# Patient Record
Sex: FEMALE | Race: WHITE | HISPANIC OR LATINO | Employment: STUDENT | URBAN - METROPOLITAN AREA
[De-identification: names, ages, dates, MRNs, and addresses within clinical notes are randomized per-mention and may not be internally consistent; named-entity substitution may affect disease eponyms.]

---

## 2019-10-14 ENCOUNTER — OFFICE VISIT (OUTPATIENT)
Dept: OBSTETRICS AND GYNECOLOGY | Facility: CLINIC | Age: 29
End: 2019-10-14

## 2019-10-14 VITALS
WEIGHT: 115 LBS | BODY MASS INDEX: 21.16 KG/M2 | DIASTOLIC BLOOD PRESSURE: 60 MMHG | HEIGHT: 62 IN | SYSTOLIC BLOOD PRESSURE: 122 MMHG

## 2019-10-14 DIAGNOSIS — Z01.419 ENCNTR FOR GYN EXAM (GENERAL) (ROUTINE) W/O ABN FINDINGS: Primary | ICD-10-CM

## 2019-10-14 DIAGNOSIS — Z97.5 INTRAUTERINE DEVICE: ICD-10-CM

## 2019-10-14 PROCEDURE — 99385 PREV VISIT NEW AGE 18-39: CPT | Performed by: OBSTETRICS & GYNECOLOGY

## 2019-10-14 NOTE — PROGRESS NOTES
Subjective   Chief Complaint   Patient presents with   • Gynecologic Exam     annual; no c/o; wants Jen removed/replaced (advised we could not do this today)     Kelli Giles is a 28 y.o. year old  presenting to be seen for her annual exam.     SEXUAL Hx:  She is currently sexually active.  In the past year there there has been NO new sexual partners.    Condoms are never used.  She would not like to be screened for STD's at today's exam.  Current birth control method: IUD -- Jen; due for replacement.  She is happy with her current method of contraception and does not want to discuss alternative methods of contraception.  MENSTRUAL Hx:  Patient's last menstrual period was 10/10/2019 (exact date).  In the past 6 months her cycles have been unpredictable infrequent.  Her menstrual flow is typically light.   Each month on average there are roughly 0 day(s) of very heavy flow.    Intermenstrual bleeding is absent.    Post-coital bleeding is absent.  Dysmenorrhea: none  PMS: none  Her cycles are not a source of concern for her that she wishes to discuss today.  HEALTH Hx:  She exercises regularly: yes.  She wears her seat belt: yes.  She has concerns about domestic violence: no.  OTHER THINGS SHE WANTS TO DISCUSS TODAY:  Nothing else    The following portions of the patient's history were reviewed and updated as appropriate:problem list, current medications, allergies, past family history, past medical history, past social history and past surgical history.    Social History    Tobacco Use      Smoking status: Never Smoker      Smokeless tobacco: Never Used    Review of Systems  Constitutional POS: nothing reported    NEG: anorexia or night sweats   Genitourinary POS: nothing reported    NEG: dysuria or hematuria      Gastointestinal POS: nothing reported    NEG: bloating, change in bowel habits, melena or reflux symptoms   Integument POS: nothing reported    NEG: moles that are changing in size, shape, color  "or rashes   Breast POS: nothing reported    NEG: persistent breast lump, skin dimpling or nipple discharge        Objective   /60   Ht 157.5 cm (62\")   Wt 52.2 kg (115 lb)   LMP 10/10/2019 (Exact Date)   Breastfeeding? No   BMI 21.03 kg/m²     General:  well developed; well nourished  no acute distress   Skin:  No suspicious lesions seen   Thyroid: normal to inspection and palpation   Breasts:  Examined in supine position  Symmetric without masses or skin dimpling  Nipples normal without inversion, lesions or discharge  There are no palpable axillary nodes   Abdomen: soft, non-tender; no masses  no umbilical or inguinal hernias are present  no hepato-splenomegaly   Pelvis: Clinical staff was present for exam  External genitalia:  normal appearance of the external genitalia including Bartholin's and [place]'s glands.  :  urethral meatus normal;  Vaginal:  normal pink mucosa without prolapse or lesions.  Cervix:  normal appearance. IUD string not seen;  Uterus:  normal size, shape and consistency.  Adnexa:  normal bimanual exam of the adnexa.        Assessment   1. Normal GYN exam  2. Jen IUD     Plan   1. Pap was done today.  If she does not receive the results of the Pap within 2 weeks  time, she was instructed to call to find out the results.  I explained to Kelli that the recommendations for Pap smear interval in a low risk patient's has lengthened to 3 years time.  I encouraged her to be seen yearly for a full physical exam including breast and pelvic exam even during the off years when PAP's will not be performed.  2. IUD string not visualized. IUD due for removal. Plan to obtain ultrasound to confirm placement and then attempt blind removal. If unsuccessful explained to patient we will need to do a hysteroscopic removal. Patient would like IUD replaced once removed with Kyleena.  3. No prescription was given or electronically sent at today's visit  4. The importance of keeping all planned follow-up " and taking all medications as prescribed was emphasized.  5. Follow up after ultrasound     No orders of the defined types were placed in this encounter.         This note was electronically signed.    Sarah Castillo, DO  October 14, 2019    Note: Speech recognition transcription software may have been used to create portions of this document.  An attempt at proofreading has been made but errors in transcription could still be present.

## 2019-10-28 ENCOUNTER — OFFICE VISIT (OUTPATIENT)
Dept: OBSTETRICS AND GYNECOLOGY | Facility: CLINIC | Age: 29
End: 2019-10-28

## 2019-10-28 VITALS — BODY MASS INDEX: 21.47 KG/M2 | SYSTOLIC BLOOD PRESSURE: 102 MMHG | DIASTOLIC BLOOD PRESSURE: 64 MMHG | WEIGHT: 117.4 LBS

## 2019-10-28 DIAGNOSIS — Z97.5 INTRAUTERINE DEVICE: Primary | ICD-10-CM

## 2019-10-28 PROCEDURE — 99213 OFFICE O/P EST LOW 20 MIN: CPT | Performed by: OBSTETRICS & GYNECOLOGY

## 2019-10-28 PROCEDURE — 58300 INSERT INTRAUTERINE DEVICE: CPT | Performed by: OBSTETRICS & GYNECOLOGY

## 2019-10-28 PROCEDURE — 58301 REMOVE INTRAUTERINE DEVICE: CPT | Performed by: OBSTETRICS & GYNECOLOGY

## 2019-10-28 NOTE — PROGRESS NOTES
Subjective   Chief Complaint   Patient presents with   • Contraception     removing Jen IUD and inserting Kyleena IUD     Kelli Giles is a 29 y.o. year old  who comes to review her recent testing and discuss next steps. Patient underwent ultrasound today to confirm IUD in place given strings not visualized on previous exam. She is due to have her Jen removed and she would like a Kyleena placed.    OTHER THINGS SHE WANTS TO DISCUSS TODAY:  Nothing else       Objective   /64   Wt 53.3 kg (117 lb 6.4 oz)   LMP 10/10/2019 (Exact Date)   Breastfeeding? No   BMI 21.47 kg/m²     Lab Review   No data reviewed    Imaging   Pelvic ultrasound report       Assessment   1. Intrauterine Device     Plan   1. Ultrasound confirmed IUD placement in endometrial cavity. Jen IUD removed and Kyleena IUD replaced without difficulty. See procedure note for details.  2. The importance of keeping all planned follow-up and taking all medications as prescribed was emphasized.  3. Follow up for string check in 4 weeks.     New Medications Ordered This Visit   Medications   • levonorgestrel (KYLEENA) 19.5 MG IUD 1 each          Total time spent today with Kelli  was 20 minutes (level 3).  Greater than 50% of the time was spent face-to-face coordinating care, answering her questions and counseling regarding pathophysiology of her presenting problem along with plans for any diagnositc work-up and treatment.    This note was electronically signed.    Sarah Castillo, DO  2019    Note: Speech recognition transcription software may have been used to create portions of this document.  An attempt at proofreading has been made but errors in transcription could still be present.

## 2019-10-28 NOTE — PROGRESS NOTES
IUD Removal and Immediate Reinsertion    Patient's last menstrual period was 10/10/2019 (exact date).    Date of procedure:  10/28/2019    Risks and benefits discussed? yes  All questions answered? yes  Consents given by the patient  Written consent obtained? yes  Reason for removal: Device expiration    Local anesthesia used:  no    Procedure documentation:    A speculum was placed in order to view the cervix.  A tenaculum did not need to be placed on the anterior cervical lip.  Cervical dilation did not need to be performed in order to access the string.  The IUD string was not easily seen.  The string was blindly grasped and the IUD was removed without difficulty.  The IUD did not appear to be adherent to the uterine cavity. It was removed intact.    A sterile speculum was replaced and the cervix was cleansed with an antiseptic solution.  Vaginal discharge was scant.  The anterior lip of the cervix was grasped with a tenaculum and the uterine cavity was gently sounded.  There was no difficulty passing the sound through the cervix.  Cervical dilation did not need to be performed prior to placing the IUD.  The uterus was retroverted and sounded to 7 cms.  The Kyleena was then prepared per the manufacturers instructions.    The Kyleena was advanced to a point 2 cms from the fundus and then the arms were released from the sheath.  The device was advanced to the fundus and the device was released fully from the sheath.. The string was cut 2 cms in length.  Bleeding from the cervix was scant.    She tolerated the procedure without any difficulty.     Post procedure instructions: It was reviewed that the Kyleena will not alter the timing of when she bleeds but it may decrease the quantity of flow and cramps.  Many woman will be amenorrheic over time.  Efficacy rate of 99.2% over 5 years was discussed.  Spontaneous expulsion rate of 1-2% was also discussed.  If she has any issue with fever or excessive bleeding or pain  she is to call the office immediately.  Otherwise I would like to see her back in 4 weeks for a string check.   This note was electronically signed.    Sarah Castillo, DO  October 28, 2019

## 2019-11-25 ENCOUNTER — OFFICE VISIT (OUTPATIENT)
Dept: OBSTETRICS AND GYNECOLOGY | Facility: CLINIC | Age: 29
End: 2019-11-25

## 2019-11-25 VITALS
HEIGHT: 62 IN | DIASTOLIC BLOOD PRESSURE: 80 MMHG | BODY MASS INDEX: 21.64 KG/M2 | WEIGHT: 117.6 LBS | SYSTOLIC BLOOD PRESSURE: 122 MMHG

## 2019-11-25 DIAGNOSIS — Z97.5 INTRAUTERINE DEVICE: Primary | ICD-10-CM

## 2019-11-25 PROCEDURE — 99213 OFFICE O/P EST LOW 20 MIN: CPT | Performed by: OBSTETRICS & GYNECOLOGY

## 2019-11-25 RX ORDER — LEVONORGESTREL 19.5 MG/1
1 INTRAUTERINE DEVICE INTRAUTERINE ONCE
COMMUNITY

## 2019-11-25 NOTE — PROGRESS NOTES
"Subjective   Chief Complaint   Patient presents with   • Follow-up     IUD check     Kelli Giles is a 29 y.o. year old .  Patient's last menstrual period was 10/29/2019 (approximate).  She presents to be seen because of IUD string check. Patient had Kyleena IUD placed on 10/28/2019. She states she has occasional light spotting. Denies pain. She is overall happy with IUD.    OTHER THINGS SHE WANTS TO DISCUSS TODAY:  Nothing else    The following portions of the patient's history were reviewed and updated as appropriate:current medications and allergies    Social History    Tobacco Use      Smoking status: Never Smoker      Smokeless tobacco: Never Used    Review of Systems  Constitutional POS: nothing reported    NEG: anorexia or night sweats   Genitourinary POS: nothing reported    NEG: dysuria or hematuria   Gastointestinal POS: nothing reported    NEG: bloating, change in bowel habits, melena or reflux symptoms   Integument POS: nothing reported    NEG: moles that are changing in size, shape, color or rashes   Breast POS: nothing reported    NEG: persistent breast lump, skin dimpling or nipple discharge         Objective   /80   Ht 157.5 cm (62\")   Wt 53.3 kg (117 lb 9.6 oz)   LMP 10/29/2019 (Approximate)   Breastfeeding? No   BMI 21.51 kg/m²     General:  well developed; well nourished  no acute distress   Skin:  Not performed.   Thyroid: not examined   Lungs:  breathing is unlabored   Heart:  Not performed.   Breasts:  Not performed.   Abdomen: soft, non-tender; no masses  no umbilical or inguinal hernias are present  no hepato-splenomegaly   Pelvis: Clinical staff was present for exam  External genitalia:  normal appearance of the external genitalia including Bartholin's and [place]'s glands.  :  urethral meatus normal;  Vaginal:  normal pink mucosa without prolapse or lesions.  Cervix:  normal appearance. IUD string present - 1 cms in length;     Lab Review   No data reviewed    Imaging   No " data reviewed        Assessment   1. Kyleena IUD in place     Plan   1. IUD strings visualized.  2. The importance of keeping all planned follow-up and taking all medications as prescribed was emphasized.  3. Follow up for annual exam in 1  year    No orders of the defined types were placed in this encounter.         This note was electronically signed.    Sarah Castillo, DO  November 25, 2019    Note: Speech recognition transcription software may have been used to create portions of this document.  An attempt at proofreading has been made but errors in transcription could still be present.

## 2020-10-28 ENCOUNTER — TELEPHONE (OUTPATIENT)
Dept: OBSTETRICS AND GYNECOLOGY | Facility: CLINIC | Age: 30
End: 2020-10-28

## 2021-01-29 ENCOUNTER — OFFICE VISIT (OUTPATIENT)
Dept: OBSTETRICS AND GYNECOLOGY | Facility: CLINIC | Age: 31
End: 2021-01-29

## 2021-01-29 VITALS
DIASTOLIC BLOOD PRESSURE: 62 MMHG | WEIGHT: 117 LBS | HEIGHT: 62 IN | SYSTOLIC BLOOD PRESSURE: 110 MMHG | BODY MASS INDEX: 21.53 KG/M2

## 2021-01-29 DIAGNOSIS — Z01.419 ENCNTR FOR GYN EXAM (GENERAL) (ROUTINE) W/O ABN FINDINGS: Primary | ICD-10-CM

## 2021-01-29 PROCEDURE — 99395 PREV VISIT EST AGE 18-39: CPT | Performed by: OBSTETRICS & GYNECOLOGY

## 2021-01-29 NOTE — PROGRESS NOTES
Subjective   Chief Complaint   Patient presents with   • Gynecologic Exam     annual; no c/o     Kelli Giles is a 30 y.o. year old  presenting to be seen for her annual exam.     SEXUAL Hx:  She is currently sexually active.  In the past year there there has been NO new sexual partners.    Condoms are never used.  She would not like to be screened for STD's at today's exam.  Current birth control method: IUD - Kyleena.  She is happy with her current method of contraception and does not want to discuss alternative methods of contraception.  MENSTRUAL Hx:  No LMP recorded (lmp unknown). Patient has had an implant.  In the past 6 months her cycles have been irregular infrequent.  Her menstrual flow is typically light.   Each month on average there are roughly 0 day(s) of very heavy flow.    Intermenstrual bleeding is absent.    Post-coital bleeding is absent.  Dysmenorrhea: none  PMS: none  Her cycles are not a source of concern for her that she wishes to discuss today.  HEALTH Hx:  She exercises regularly: yes.  She wears her seat belt: yes.  She has concerns about domestic violence: no.  OTHER THINGS SHE WANTS TO DISCUSS TODAY:  Nothing else    The following portions of the patient's history were reviewed and updated as appropriate:problem list, current medications, allergies, past family history, past medical history, past social history and past surgical history.    Social History    Tobacco Use      Smoking status: Never Smoker      Smokeless tobacco: Never Used    Review of Systems  Constitutional POS: nothing reported    NEG: anorexia or night sweats   Genitourinary POS: nothing reported    NEG: dysuria or hematuria      Gastointestinal POS: nothing reported    NEG: bloating, change in bowel habits, melena or reflux symptoms   Integument POS: nothing reported    NEG: moles that are changing in size, shape, color or rashes   Breast POS: nothing reported    NEG: persistent breast lump, skin dimpling or  "nipple discharge        Objective   /62   Ht 157.5 cm (62\")   Wt 53.1 kg (117 lb)   LMP  (LMP Unknown)   Breastfeeding No   BMI 21.40 kg/m²     General:  well developed; well nourished  no acute distress   Skin:  No suspicious lesions seen   Thyroid: normal to inspection and palpation   Breasts:  Examined in supine position  Symmetric without masses or skin dimpling  Nipples normal without inversion, lesions or discharge  There are no palpable axillary nodes   Abdomen: soft, non-tender; no masses  no umbilical or inguinal hernias are present  no hepato-splenomegaly   Pelvis: Clinical staff was present for exam  External genitalia:  normal appearance of the external genitalia including Bartholin's and [place]'s glands.  :  urethral meatus normal;  Vaginal:  normal pink mucosa without prolapse or lesions.  Cervix:  normal appearance. IUD string present  Uterus:  normal size, shape and consistency.  Adnexa:  normal bimanual exam of the adnexa.        Assessment   1. Normal GYN exam  2. Kyleena IUD in place     Plan   Pap was not done today.  I explained to Kelli that the recommendations for Pap smear interval in a low risk patient has lengthened to 3 years time.  I told Kelli she still needs to be seen in our office yearly for a full physical including breast and pelvic exam.  1. IUD strings visualized.  2. Counseled on continued healthy diet and exercise. Counseled on flu vaccine.  3. No prescription was given or electronically sent at today's visit  4. The importance of keeping all planned follow-up and taking all medications as prescribed was emphasized.  5. Follow up for annual exam in 1 year    No orders of the defined types were placed in this encounter.         This note was electronically signed.    Sarah Castillo DO  January 29, 2021    Note: Speech recognition transcription software may have been used to create portions of this document.  An attempt at proofreading has been made but errors in " transcription could still be present

## 2021-11-28 PROBLEM — Z01.419 WELL WOMAN EXAM: Status: ACTIVE | Noted: 2021-11-28

## 2021-11-28 NOTE — PROGRESS NOTES
Subjective   Chief Complaint   Patient presents with   • Gynecologic Exam     Kelli Giles is a 31 y.o. year old  presenting to be seen for her annual exam.     SEXUAL Hx:  She is currently sexually active.  In the past year there there has been NO new sexual partners.    Condoms are never used.  She would not like to be screened for STD's at today's exam.  Current birth control method: IUD - Kyleena.  She is happy with her current method of contraception and does not want to discuss alternative methods of contraception.  MENSTRUAL Hx:  No LMP recorded (lmp unknown). Patient has had an implant.  In the past 6 months her cycles have been infrequent.  Her menstrual flow is typically light.   Each month on average there are roughly 0 day(s) of very heavy flow.  Intermenstrual bleeding is absent.    Post-coital bleeding is absent.  Dysmenorrhea: is not affecting her activities of daily living  PMS: is not affecting her activities of daily living  Her cycles are not a source of concern for her that she wishes to discuss today.  HEALTH Hx:  She exercises regularly: yes.  She wears her seat belt: yes.  She has concerns about domestic violence: no.  OTHER THINGS SHE WANTS TO DISCUSS TODAY:  Nothing else    The following portions of the patient's history were reviewed and updated as appropriate:problem list, current medications, allergies, past family history, past medical history, past social history and past surgical history.    Social History    Tobacco Use      Smoking status: Never Smoker      Smokeless tobacco: Never Used    Review of Systems  Constitutional POS: nothing reported    NEG: anorexia or night sweats   Genitourinary POS: nothing reported    NEG: dysuria or hematuria      Gastointestinal POS: nothing reported    NEG: bloating, change in bowel habits, melena or reflux symptoms   Integument POS: nothing reported and she sees her dermatologist for routine skins exams    NEG: moles that are changing in  size, shape, color or rashes   Breast POS: nothing reported    NEG: persistent breast lump, skin dimpling or nipple discharge        Objective   /74   Resp 14   Wt 52.6 kg (116 lb)   LMP  (LMP Unknown)   Breastfeeding No   BMI 21.22 kg/m²     General:  well developed; well nourished  no acute distress   Skin:  No suspicious lesions seen   Thyroid: normal to inspection and palpation   Breasts:  Examined in supine position  Symmetric without masses or skin dimpling  Nipples normal without inversion, lesions or discharge  There are no palpable axillary nodes   Abdomen: soft, non-tender; no masses  no umbilical or inguinal hernias are present  no hepato-splenomegaly   Pelvis: Clinical staff was present for exam  External genitalia:  normal appearance of the external genitalia including Bartholin's and [place]'s glands.  :  urethral meatus normal;  Vaginal:  normal pink mucosa without prolapse or lesions.  Cervix:  normal appearance. IUD string present - 1.5 cms in length;  Uterus:  normal size, shape and consistency. retroverted;  Adnexa:  normal bimanual exam of the adnexa.  Rectal:  digital rectal exam not performed; anus visually normal appearing.        Assessment   1. Normal GYN exam  2. She is up to date on all relevant gynecologic and colorectal screenings     Plan   1. Pap was not done today.  I explained to Kelli that the recommendations for Pap smear interval in a low risk patient has lengthened to 3 years time.  I told Kelli she still needs to be seen in our office yearly for a full physical including breast and pelvic exam.  2. Today I discussed with Kelli the total recommended calcium intake for a premenopausal female is 1000 mg.  Ideally this should be from dietary sources.  I reviewed calcium content in various foods including milk, fortified orange juice and yogurt.  If she cannot get sufficient calcium through dietary means, it is recommended to supplement with either a multivitamin or  calcium to reach her daily goal.  I also reviewed the difference in the bioavailability of calcium carbonate and calcium citrate containing supplements and the importance of taking calcium carbonate containing products with food.  Finally, vitamin D's role in calcium absorption was reviewed and a total daily vitamin D intake of 800 units was recommended.  3. I discussed with Kelli that she may be behind on needed vaccinations for Influenza and COVID.  She may be able to obtain these vaccinations at her local pharmacy OR speak about obtaining them with her primary care.  If she does obtain her vaccines, I have asked Kelli to let us know the date each vaccine was obtained so that her medical record could be updated in our system.  4. The importance of keeping all planned follow-up and taking all medications as prescribed was emphasized.  5. Follow up for annual exam 1 year         This note was electronically signed.    Jacob Benton M.D.  November 29, 2021    Part of this note may be an electronic transcription/translation of spoken language to printed text using the Dragon Dictation System.

## 2021-11-29 ENCOUNTER — OFFICE VISIT (OUTPATIENT)
Dept: OBSTETRICS AND GYNECOLOGY | Facility: CLINIC | Age: 31
End: 2021-11-29

## 2021-11-29 VITALS
BODY MASS INDEX: 21.22 KG/M2 | RESPIRATION RATE: 14 BRPM | SYSTOLIC BLOOD PRESSURE: 108 MMHG | WEIGHT: 116 LBS | DIASTOLIC BLOOD PRESSURE: 74 MMHG

## 2021-11-29 DIAGNOSIS — Z01.419 WELL WOMAN EXAM: Primary | ICD-10-CM

## 2021-11-29 DIAGNOSIS — Z71.3 NUTRITIONAL COUNSELING: ICD-10-CM

## 2021-11-29 DIAGNOSIS — Z71.85 VACCINE COUNSELING: ICD-10-CM

## 2021-11-29 PROCEDURE — 99395 PREV VISIT EST AGE 18-39: CPT | Performed by: OBSTETRICS & GYNECOLOGY

## 2021-11-29 NOTE — PATIENT INSTRUCTIONS
Calcium and Vitamin D Information for Bone Health    How much calcium do I need?    Optimal Daily        Age Intake (in mgs.)     14 - 18 1300      18 - 50 1000     Over 50 1200     Breast feeding 1000     How much Vitamin D do I need?        Age    International Units     < 70 600     > 70 800         * Serum vitamin D levels should be between 20 - 50 ng/ml.       * Levels > 50 ng/ml may have adverse effects.     Food   Amount Calcium (mgs.) Fat (gms.)   Milk and Dairy Products      Low-fat plain yogurt 1 cup 415 3.4   Skim Milk 1 cup 303 0.4   Low-fat milk 1 cup 298 4.7   Swiss cheese 1 oz. 219 7.1   Cheddar cheese 1 oz. 211 9.1   American Cheese 1 oz. 195 8.4   Ice cream (hard) 1 cup 176 14.1         Nuts      Sesame seeds, dried 3 ½  ozs. 100 53.4   Almonds 1 oz. 66 16.2         Seafood      Scallops, steamed 3 ½  ozs. 115 1.4   Shrimp, raw 3 ½  ozs. 63 0.8         Green Leafy vegetables      Kale, cooked without stem ¾ cup 187 0.7   Turnip greens, cooked ½ cup 184 0.2   Broccoli, cooked 2/3 cup 88 0.3   Spinach, cooked ½ cup 83 0.3         Other foods      Pudding, chocolate ½ cup 147 6.6   Slice from 12 inch cheese pizza 1 piece 144 4.4   Cream of celery soup made with milk 1 serving 135 33.0   Figs, dried 5 medium 126 1.3   Raisins, dried, seedless 5/8 cup 62 0.2   Chili con carne with beans 5 oz. 61 9.9     Is there a difference between calcium supplements?   • Calcium carbonate is the best supplement to prevent bone loss.  To be absorbed maximally, it must be taken with a meal.   • If you cannot take your calcium with a meal, calcium citrate is the best supplement            Preventing Birth Defects with Folic Acid  What is folic acid?  • Folic acid is a vitamin that is used by the body to create new cells and keep the blood healthy. Everyone needs folic acid to stay healthy. It is especially important if you are pregnant.  • Folic acid is artificial (synthetic). The vitamin in its natural form is called  folate. Some foods are natural sources of folate, and other foods have folic acid added to them (fortified foods). You can also buy folic acid supplements or vitamins that contain folic acid.  Why is folic acid important during pregnancy?  Folic acid helps reduce your baby's risk of serious birth defects, especially the class of birth defects called neural tube defects (NTD).  Types of NTD's include:  · Spina bifida. Spina bifida occurs when a baby's spinal column does not develop completely. This can cause serious, long-term (chronic) disabilities.  · Anencephaly. This is a birth defect that causes your baby to be missing parts of the brain, scalp, and skull when he or she is born. Most babies born with anencephaly only live for a short amount of time.  How much folic acid do I need?  • If you plan to become pregnant, you should take at least 400 mcg (micrograms) of folic acid daily. Birth defects usually occur in the earliest stages of pregnancy, often before you know you are pregnant. Taking folic acid when you start trying to become pregnant can help prevent birth defects.  • While you are pregnant, you need at least 600 mcg of folic acid each day.  • If you have had a child with a NTD, you may be recommended you take at least 5,000 mcg of folic acid daily.  • If you take certain medications (certain medications for control of seizures) you made need more the 400 mcg.  What nutrition changes can be made?  To increase your intake of folate and folic acid, you may:  · Eat more foods that are fortified with folic acid. Check food labels to see whether a food contains folic acid. Foods that are commonly fortified with folic acid include:  ? Cereals.  ? Pastas.  ? Flours.  ? White rice.  ? Breads.  · Eat more foods that are natural sources of folate, such as:  ? Legumes, including lentils, peas, and beans.  ? Nuts.  ? Vegetables, especially dark green leafy vegetables, such as spinach and mustard greens.  ? Citrus  fruits and juices.  Your health care provider may still recommend that you take a supplement to make sure that you get enough to reduce the risk of birth defects.  Where to find support  · Talk with your health care provider or your pharmacist to find a folic acid supplement that is right for you.  · Your health care provider may recommend that you see a nutrition specialist (nutritionist). A nutritionist can help you make healthy food choices and get more folate from your diet.  · Nutrition education programs may be available through your [facility] health department.  Where to find more information  Visit the following websites to learn more about the importance of folic acid.  · Centers for Disease Control and Prevention: www.cdc.gov/ncbddd/folicacid/about.html  · The Office on Women's Health: womenshPeriGen.gov/publications/our-publications/fact-sheet/folic-acid.html  · National Institutes of Health: ods.od.nih.gov/factsheets/Folate-Consumer  · The March of Dimes: www.marchofdimes.org/pregnancy/folic-acid.aspx  Summary  · It is important to start taking folic acid when you are planning to become pregnant, because many birth defects can happen before you know that you are pregnant.  · You can get more folate and folic acid from your diet. Look for certain foods that are fortified with folic acid.  · Your health care provider may recommend that you take a supplement to get enough folic acid.    This information is not intended to replace advice given to you by your health care provider. Make sure you discuss any questions you have with your health care provider.  Document Released: 01/18/2017 Document Revised: 11/30/2018 Document Reviewed: 01/18/2017  Elsevier Patient Education © 2020 Elsevier Inc.

## 2022-12-01 ENCOUNTER — OFFICE VISIT (OUTPATIENT)
Dept: OBSTETRICS AND GYNECOLOGY | Facility: CLINIC | Age: 32
End: 2022-12-01

## 2022-12-01 VITALS
RESPIRATION RATE: 14 BRPM | BODY MASS INDEX: 22.68 KG/M2 | WEIGHT: 124 LBS | SYSTOLIC BLOOD PRESSURE: 112 MMHG | DIASTOLIC BLOOD PRESSURE: 66 MMHG

## 2022-12-01 DIAGNOSIS — Z01.419 WELL WOMAN EXAM: Primary | ICD-10-CM

## 2022-12-01 DIAGNOSIS — Z71.85 VACCINE COUNSELING: ICD-10-CM

## 2022-12-01 DIAGNOSIS — Z30.431 CHECKING OF INTRAUTERINE DEVICE: ICD-10-CM

## 2022-12-01 PROCEDURE — 99395 PREV VISIT EST AGE 18-39: CPT | Performed by: OBSTETRICS & GYNECOLOGY

## 2022-12-01 NOTE — PATIENT INSTRUCTIONS

## 2022-12-02 LAB — REF LAB TEST METHOD: NORMAL

## 2023-03-06 ENCOUNTER — OFFICE VISIT (OUTPATIENT)
Dept: SLEEP MEDICINE | Facility: HOSPITAL | Age: 33
End: 2023-03-06
Payer: COMMERCIAL

## 2023-03-06 VITALS
BODY MASS INDEX: 22.67 KG/M2 | OXYGEN SATURATION: 98 % | SYSTOLIC BLOOD PRESSURE: 119 MMHG | WEIGHT: 123.2 LBS | DIASTOLIC BLOOD PRESSURE: 65 MMHG | HEIGHT: 62 IN | HEART RATE: 67 BPM

## 2023-03-06 DIAGNOSIS — G47.19 EXCESSIVE DAYTIME SLEEPINESS: Primary | ICD-10-CM

## 2023-03-06 DIAGNOSIS — G47.33 OBSTRUCTIVE SLEEP APNEA, ADULT: ICD-10-CM

## 2023-03-06 DIAGNOSIS — R06.83 SNORING: ICD-10-CM

## 2023-03-06 PROCEDURE — 99204 OFFICE O/P NEW MOD 45 MIN: CPT | Performed by: INTERNAL MEDICINE

## 2023-03-06 NOTE — PROGRESS NOTES
"  Kelli Giles is a 32 y.o. female.   Chief Complaint   Patient presents with   • Sleeping Problem       HPI     32 y.o. female seen for the above    She has a longstanding history of disturbed sleep.    She describes as a child being told by her sister that she awakens frequently.  She recalls frequent awakenings and thinks her sleep is light as she awakens easily.    She has been told she snores loudly.    She has increasingly nonrestorative sleep.  This has really come to ahead over the last several years and she is interested in getting it evaluated.    She does have a sore throat in the morning.  She has awoken with a sensation that her airway is closed.    She denies any nocturnal hallucinations, sleep paralysis, or cataplexy.  She has no RLS type symptoms.    She averages 7-8 hours of sleep per night.  She will usually initiate sleep fairly quickly and awaken 1-3 times.      [place] Scale is: 4/24    The patient's relevant past medical, surgical, family, and social history reviewed and updated in Epic as appropriate.    Current medications are:   Current Outpatient Medications:   •  Levonorgestrel (KYLEENA) 19.5 MG intrauterine device IUD, 1 each by Intrauterine route 1 (One) Time., Disp: , Rfl:   •  Multiple Vitamins-Minerals (MULTIVITAMIN ADULT EXTRA C PO), Take  by mouth., Disp: , Rfl: .    Review of Systems    Review of Systems  ROS documented in patient questionnaire ×14 systems.  Reviewed with patient.  Otherwise negative except as noted in HPI.    Physical Exam    Blood pressure 119/65, pulse 67, height 157.5 cm (62\"), weight 55.9 kg (123 lb 3.2 oz), SpO2 98 %, not currently breastfeeding. Body mass index is 22.53 kg/m².    Physical Exam  Vitals and nursing note reviewed.   Constitutional:       Appearance: Normal appearance. She is well-developed.   HENT:      Head: Normocephalic and atraumatic.      Nose: Nose normal.      Mouth/Throat:      Mouth: Mucous membranes are moist.      Pharynx: Oropharynx " is clear. No oropharyngeal exudate.      Comments: Class IV airway  Eyes:      General: No scleral icterus.     Conjunctiva/sclera: Conjunctivae normal.   Neck:      Thyroid: No thyromegaly.      Trachea: No tracheal deviation.   Cardiovascular:      Rate and Rhythm: Normal rate and regular rhythm.      Heart sounds: No murmur heard.    No friction rub. No gallop.   Pulmonary:      Effort: Pulmonary effort is normal. No respiratory distress.      Breath sounds: No wheezing or rales.   Musculoskeletal:         General: No deformity. Normal range of motion.   Skin:     General: Skin is warm and dry.      Findings: No rash.   Neurological:      Mental Status: She is alert and oriented to person, place, and time.   Psychiatric:         Behavior: Behavior normal.         Thought Content: Thought content normal.         DATA:    Reviewed 12/1/2022 note from Dr. Benton    Reviewed bed partner questionnaire    ASSESSMENT:    Problem List Items Addressed This Visit    None  Visit Diagnoses     Excessive daytime sleepiness    -  Primary    Obstructive sleep apnea, adult        Snoring              32-year-old female with what appears to be obstructive sleep apnea.  This is likely present despite her young age and low BMI.  She has described a longstanding history of snoring and this is likely related to her anatomy.  She has a class IV airway with an elongated tongue and narrow posterior pharynx.  She also has some retrognathia.  I think she clearly warrants further investigation for the presence of CHANTALE given her longstanding symptoms as described.  She has disturbed sleep and daytime somnolence.    PLAN:    1. Home sleep apnea testing is an appropriate initial diagnostic step in her case.  2. I discussed the diagnostic process as well as treatment options for obstructive sleep apnea if that is diagnosed.  3. I went over the long-term cardiovascular and metabolic risks of untreated obstructive sleep apnea.  4. I recommended  long-term, healthy weight loss.  5. The patient was amenable to a trial of CPAP therapy if deemed appropriate after testing complete.  6. Close sleep center follow-up.      I have reviewed the results of my evaluation and impression and discussed my recommendations in detail with the patient.    Signed by  Jose L Ramos MD    March 6, 2023      CC: Provider, No Known          Referring, Self

## 2023-03-31 ENCOUNTER — HOSPITAL ENCOUNTER (OUTPATIENT)
Dept: SLEEP MEDICINE | Facility: HOSPITAL | Age: 33
Discharge: HOME | End: 2023-03-31
Admitting: INTERNAL MEDICINE
Payer: COMMERCIAL

## 2023-03-31 VITALS — BODY MASS INDEX: 22.68 KG/M2 | WEIGHT: 123.24 LBS | HEIGHT: 62 IN

## 2023-03-31 DIAGNOSIS — R06.83 SNORING: ICD-10-CM

## 2023-03-31 DIAGNOSIS — G47.19 EXCESSIVE DAYTIME SLEEPINESS: ICD-10-CM

## 2023-03-31 DIAGNOSIS — G47.33 OBSTRUCTIVE SLEEP APNEA, ADULT: ICD-10-CM

## 2023-03-31 PROCEDURE — 95806 SLEEP STUDY UNATT&RESP EFFT: CPT

## 2023-04-04 DIAGNOSIS — G47.33 OSA (OBSTRUCTIVE SLEEP APNEA): Primary | ICD-10-CM

## 2023-04-04 PROCEDURE — 95806 SLEEP STUDY UNATT&RESP EFFT: CPT | Performed by: INTERNAL MEDICINE

## 2023-05-05 ENCOUNTER — OFFICE VISIT (OUTPATIENT)
Dept: FAMILY MEDICINE CLINIC | Facility: CLINIC | Age: 33
End: 2023-05-05
Payer: COMMERCIAL

## 2023-05-05 ENCOUNTER — LAB (OUTPATIENT)
Dept: LAB | Facility: HOSPITAL | Age: 33
End: 2023-05-05
Payer: COMMERCIAL

## 2023-05-05 VITALS
SYSTOLIC BLOOD PRESSURE: 110 MMHG | OXYGEN SATURATION: 98 % | HEART RATE: 68 BPM | DIASTOLIC BLOOD PRESSURE: 66 MMHG | WEIGHT: 124.2 LBS | HEIGHT: 62 IN | BODY MASS INDEX: 22.86 KG/M2

## 2023-05-05 DIAGNOSIS — Z00.00 WELL ADULT EXAM: ICD-10-CM

## 2023-05-05 DIAGNOSIS — Z00.00 WELL ADULT EXAM: Primary | ICD-10-CM

## 2023-05-05 LAB
ALBUMIN SERPL-MCNC: 4.9 G/DL (ref 3.5–5.2)
ALBUMIN/GLOB SERPL: 1.7 G/DL
ALP SERPL-CCNC: 31 U/L (ref 39–117)
ALT SERPL-CCNC: 17 U/L (ref 1–33)
AST SERPL-CCNC: 19 U/L (ref 1–32)
BILIRUB SERPL-MCNC: 0.3 MG/DL (ref 0–1.2)
BUN SERPL-MCNC: 14 MG/DL (ref 6–20)
BUN/CREAT SERPL: 16.7 (ref 7–25)
CALCIUM SERPL-MCNC: 10.3 MG/DL (ref 8.6–10.5)
CHLORIDE SERPL-SCNC: 103 MMOL/L (ref 98–107)
CHOLEST SERPL-MCNC: 191 MG/DL (ref 0–200)
CO2 SERPL-SCNC: 26.1 MMOL/L (ref 22–29)
CREAT SERPL-MCNC: 0.84 MG/DL (ref 0.57–1)
EGFRCR SERPLBLD CKD-EPI 2021: 94.8 ML/MIN/1.73
ERYTHROCYTE [DISTWIDTH] IN BLOOD BY AUTOMATED COUNT: 12.1 % (ref 12.3–15.4)
GLOBULIN SER CALC-MCNC: 2.9 GM/DL
GLUCOSE SERPL-MCNC: 90 MG/DL (ref 65–99)
HCT VFR BLD AUTO: 43.8 % (ref 34–46.6)
HDLC SERPL-MCNC: 64 MG/DL (ref 40–60)
HGB BLD-MCNC: 14.7 G/DL (ref 12–15.9)
LDLC SERPL CALC-MCNC: 120 MG/DL (ref 0–100)
MCH RBC QN AUTO: 30.6 PG (ref 26.6–33)
MCHC RBC AUTO-ENTMCNC: 33.6 G/DL (ref 31.5–35.7)
MCV RBC AUTO: 91.1 FL (ref 79–97)
PLATELET # BLD AUTO: 220 10*3/MM3 (ref 140–450)
POTASSIUM SERPL-SCNC: 4.7 MMOL/L (ref 3.5–5.2)
PROT SERPL-MCNC: 7.8 G/DL (ref 6–8.5)
RBC # BLD AUTO: 4.81 10*6/MM3 (ref 3.77–5.28)
SODIUM SERPL-SCNC: 140 MMOL/L (ref 136–145)
TRIGL SERPL-MCNC: 37 MG/DL (ref 0–150)
TSH SERPL DL<=0.005 MIU/L-ACNC: 3.39 UIU/ML (ref 0.27–4.2)
VLDLC SERPL CALC-MCNC: 7 MG/DL (ref 5–40)
WBC # BLD AUTO: 5.52 10*3/MM3 (ref 3.4–10.8)

## 2023-05-05 NOTE — PROGRESS NOTES
"Chief Complaint  New Patient and Annual Exam    Subjective          Kelli Giles presents to [facility] for   History of Present Illness    Planning conception soon.     Balance diet. She has a lot of dairy and meat.     Cardio, running, weight bearing exercises.     She supplements with calcium.               5/5/2023     9:35 AM   PHQ-2/PHQ-9 Depression Screening   Little Interest or Pleasure in Doing Things 0-->not at all   Feeling Down, Depressed or Hopeless 0-->not at all   Trouble Falling or Staying Asleep, or Sleeping Too Much 0-->not at all   Feeling Tired or Having Little Energy 0-->not at all   Poor Appetite or Overeating 0-->not at all   Feeling Bad about Yourself - or that You are a Failure or Have Let Yourself or Your Family Down 0-->not at all   Trouble Concentrating on Things, Such as Reading the Newspaper or Watching Television 0-->not at all   Moving or Speaking So Slowly that Other People Could Have Noticed? Or the Opposite - Being So Fidgety 0-->not at all   Thoughts that You Would be Better Off Dead or of Hurting Yourself in Some Way 0-->not at all   PHQ-9: Brief Depression Severity Measure Score 0   If You Checked Off Any Problems, How Difficult Have These Problems Made It For You to Do Your Work, Take Care of Things at Home, or Get Along with Other People? not difficult at all       Objective   Vital Signs:   Vitals:    05/05/23 0932   BP: 110/66   Pulse: 68   SpO2: 98%   Weight: 56.3 kg (124 lb 3.2 oz)   Height: 157.5 cm (62.01\")     Body mass index is 22.71 kg/m².    BMI is within normal parameters. No other follow-up for BMI required.          Physical Exam  Constitutional:       General: She is not in acute distress.  HENT:      Right Ear: Tympanic membrane and ear canal normal.      Left Ear: Tympanic membrane and ear canal normal.      Nose: No congestion or rhinorrhea.      Mouth/Throat:      Mouth: Mucous membranes are moist.      Pharynx: No oropharyngeal " exudate or posterior oropharyngeal erythema.   Eyes:      General:         Right eye: No discharge.         Left eye: No discharge.      Conjunctiva/sclera: Conjunctivae normal.   Neck:      Thyroid: No thyromegaly.   Cardiovascular:      Rate and Rhythm: Normal rate and regular rhythm.   Pulmonary:      Effort: Pulmonary effort is normal.      Breath sounds: Normal breath sounds.   Abdominal:      Palpations: Abdomen is soft. There is no hepatomegaly.      Tenderness: There is no abdominal tenderness.   Musculoskeletal:      Cervical back: Neck supple.   Lymphadenopathy:      Head:      Right side of head: No submandibular, preauricular or posterior auricular adenopathy.      Left side of head: No submandibular, preauricular or posterior auricular adenopathy.      Cervical: No cervical adenopathy.   Skin:     General: Skin is warm.   Neurological:      Mental Status: She is alert and oriented to person, place, and time.   Psychiatric:         Mood and Affect: Mood normal.         Behavior: Behavior normal.         Thought Content: Thought content normal.         Judgment: Judgment normal.        Result Review :                Immunization History   Administered Date(s) Administered   • Hepatitis A 01/21/2021   • Tdap 12/02/2022       Health Maintenance   Topic Date Due   • COVID-19 Vaccine (1) Never done   • HEPATITIS C SCREENING  Never done   • ANNUAL PHYSICAL  Never done   • INFLUENZA VACCINE  08/01/2023   • PAP SMEAR  12/01/2025   • TDAP/TD VACCINES (2 - Td or Tdap) 12/02/2032   • Pneumococcal Vaccine 0-64  Aged Out            Assessment and Plan    Diagnoses and all orders for this visit:    1. Well adult exam (Primary)  -     CBC (No Diff); Future  -     Comprehensive Metabolic Panel; Future  -     Lipid Panel; Future  -     TSH; Future    Check screening fasting labs today.  Continue well woman care with OB/GYN.  Cervical cancer screening Pap smear up-to-date.  Tetanus vaccine  up-to-date.    Counseling/anticipatory guidance: Nutrition, physical activity,  dental health      Follow Up   Return in about 1 year (around 5/5/2024) for Physical.  Patient was given instructions and counseling regarding her condition or for health maintenance advice. Please see specific information pulled into the AVS if appropriate.     Electronically signed by Janelle Grimm MD, 05/05/23, 10:02 AM EDT.

## 2023-05-08 ENCOUNTER — HOSPITAL ENCOUNTER (OUTPATIENT)
Dept: SLEEP MEDICINE | Facility: HOSPITAL | Age: 33
End: 2023-05-08
Payer: COMMERCIAL

## 2023-05-08 VITALS — WEIGHT: 124.12 LBS | HEIGHT: 62 IN | BODY MASS INDEX: 22.84 KG/M2

## 2023-05-08 DIAGNOSIS — G47.33 OSA (OBSTRUCTIVE SLEEP APNEA): ICD-10-CM

## 2023-05-08 PROCEDURE — 95810 POLYSOM 6/> YRS 4/> PARAM: CPT | Performed by: INTERNAL MEDICINE

## 2023-05-08 PROCEDURE — 95810 POLYSOM 6/> YRS 4/> PARAM: CPT

## 2023-11-17 ENCOUNTER — OFFICE VISIT (OUTPATIENT)
Dept: OBSTETRICS AND GYNECOLOGY | Facility: CLINIC | Age: 33
End: 2023-11-17
Payer: COMMERCIAL

## 2023-11-17 VITALS — RESPIRATION RATE: 14 BRPM | WEIGHT: 117 LBS | BODY MASS INDEX: 21.39 KG/M2

## 2023-11-17 DIAGNOSIS — Z30.432 ENCOUNTER FOR REMOVAL OF INTRAUTERINE CONTRACEPTIVE DEVICE: Primary | ICD-10-CM

## 2023-11-17 PROBLEM — Z97.5 INTRAUTERINE DEVICE: Status: RESOLVED | Noted: 2019-10-28 | Resolved: 2023-11-17

## 2023-11-17 NOTE — PROGRESS NOTES
IUD Removal    Date of procedure:  11/17/2023    Risks and benefits discussed? yes  All questions answered? yes  Consents given by The patient  Written consent obtained? yes  Reason for removal: Desires pregnancy.  Is on a folic acid containing MV already    Local anesthesia used:  no    Procedure documentation:    A speculum was placed in order to view the cervix.  A tenaculum did not need to be placed on the anterior cervical lip.  Cervical dilation did not need to be performed in order to access the string.  The IUD string was easily seen.  The string was grasped and the IUD was removed without difficulty.  The IUD did not appear to be adherent to the uterine cavity. It was removed intact.    She tolerated the procedure without any difficulty.     Post procedure instructions: Patient notified to call with heavy bleeding, fever or increasing pain.    Follow up for annual exam      This note was electronically signed.    Jacob Benton M.D.  November 17, 2023

## 2023-12-04 ENCOUNTER — OFFICE VISIT (OUTPATIENT)
Dept: OBSTETRICS AND GYNECOLOGY | Facility: CLINIC | Age: 33
End: 2023-12-04
Payer: COMMERCIAL

## 2023-12-04 VITALS
DIASTOLIC BLOOD PRESSURE: 68 MMHG | BODY MASS INDEX: 22.13 KG/M2 | SYSTOLIC BLOOD PRESSURE: 110 MMHG | WEIGHT: 121 LBS | RESPIRATION RATE: 14 BRPM

## 2023-12-04 DIAGNOSIS — Z01.419 WELL WOMAN EXAM: Primary | ICD-10-CM

## 2023-12-04 DIAGNOSIS — Z71.85 VACCINE COUNSELING: ICD-10-CM

## 2023-12-04 PROCEDURE — 99395 PREV VISIT EST AGE 18-39: CPT | Performed by: OBSTETRICS & GYNECOLOGY

## 2023-12-04 NOTE — PROGRESS NOTES
Subjective   Chief Complaint   Patient presents with    Gynecologic Exam     Kelli Giles is a 33 y.o. year old  presenting to be seen for her annual exam.  She was here recently and have the IUD removed.  There is been no problems since.    SEXUAL Hx:  She is currently sexually active.  In the past year there there has been NO new sexual partners.    Condoms are never used.  She would not like to be screened for STD's at today's exam.  Current birth control method: not using any form of contraception because she is currently trying to conceive.  HEALTH Hx:  She exercises regularly: yes.  She wears her seat belt: yes.  She has concerns about domestic violence: no.    The following portions of the patient's history were reviewed and updated as appropriate:problem list, current medications, allergies, past family history, past medical history, past social history, and past surgical history.    Social History    Tobacco Use      Smoking status: Never      Smokeless tobacco: Never    Review of Systems  Constitutional POS: nothing reported    NEG: anorexia or night sweats   Genitourinary POS: nothing reported    NEG: dysuria or hematuria      Gastointestinal POS: nothing reported    NEG: bloating, change in bowel habits, melena, or reflux symptoms   Integument POS: nothing reported    NEG: moles that are changing in size, shape, color or rashes   Breast POS: nothing reported    NEG: persistent breast lump, skin dimpling, or nipple discharge        Objective   /68   Resp 14   Wt 54.9 kg (121 lb)   LMP 2023 (Approximate)   BMI 22.13 kg/m²     General:  well developed; well nourished  no acute distress   Skin:  No suspicious lesions seen   Thyroid: normal to inspection and palpation   Breasts:  Examined in supine position  Symmetric without masses or skin dimpling  Nipples normal without inversion, lesions or discharge  There are no palpable axillary nodes   Abdomen: soft, non-tender; no masses  no  umbilical or inguinal hernias are present  no hepato-splenomegaly   Pelvis: Clinical staff was present for exam  External genitalia:  normal appearance of the external genitalia including Bartholin's and Ucon's glands.  :  urethral meatus normal;  Vaginal:  normal pink mucosa without prolapse or lesions.  Cervix:  normal appearance.  Uterus:  normal size, shape and consistency.  Adnexa:  normal bimanual exam of the adnexa.  Rectal:  digital rectal exam not performed; anus visually normal appearing.        Assessment   Normal GYN exam  Preconceptional currently on folate       Plan   Pap was not done today.  I explained to Kelli that the recommendations for Pap smear interval in a low risk patient has lengthened to 3 years time.  I told Kelli she still needs to be seen in our office yearly for a full physical including breast and pelvic exam.  Her vaccine record was reviewed and updated.  I discussed with Kelli that she may be behind on needed vaccinations for Influenza and COVID.  She may be able to obtain these vaccinations at her local pharmacy OR speak about obtaining them with her primary care.  If she does obtain her vaccines, I have asked Kelli to let us know the date each vaccine was obtained so that her medical record could be updated in our system.  The importance of keeping all planned follow-up and taking all medications as prescribed was emphasized.  Follow up for annual exam 1 year           This note was electronically signed.    Jacob Benton M.D.  December 4, 2023    Part of this note may be an electronic transcription/translation of spoken language to printed text using the Dragon Dictation System.

## 2024-05-06 ENCOUNTER — OFFICE VISIT (OUTPATIENT)
Age: 34
End: 2024-05-06
Payer: COMMERCIAL

## 2024-05-06 VITALS
OXYGEN SATURATION: 99 % | WEIGHT: 122.7 LBS | DIASTOLIC BLOOD PRESSURE: 58 MMHG | HEART RATE: 83 BPM | BODY MASS INDEX: 22.58 KG/M2 | HEIGHT: 62 IN | SYSTOLIC BLOOD PRESSURE: 110 MMHG

## 2024-05-06 DIAGNOSIS — Z00.00 WELL ADULT EXAM: Primary | ICD-10-CM

## 2024-05-06 PROCEDURE — 99395 PREV VISIT EST AGE 18-39: CPT | Performed by: FAMILY MEDICINE

## 2024-05-17 PROBLEM — Z34.00 PREGNANCY, SUPERVISION, NORMAL, FIRST: Status: ACTIVE | Noted: 2024-05-17

## 2024-05-20 ENCOUNTER — LAB (OUTPATIENT)
Dept: LAB | Facility: HOSPITAL | Age: 34
End: 2024-05-20
Payer: COMMERCIAL

## 2024-05-20 ENCOUNTER — INITIAL PRENATAL (OUTPATIENT)
Dept: OBSTETRICS AND GYNECOLOGY | Facility: CLINIC | Age: 34
End: 2024-05-20
Payer: COMMERCIAL

## 2024-05-20 VITALS — WEIGHT: 121 LBS | SYSTOLIC BLOOD PRESSURE: 112 MMHG | BODY MASS INDEX: 22.13 KG/M2 | DIASTOLIC BLOOD PRESSURE: 68 MMHG

## 2024-05-20 DIAGNOSIS — Z71.85 VACCINE COUNSELING: ICD-10-CM

## 2024-05-20 DIAGNOSIS — Z34.01 ENCOUNTER FOR SUPERVISION OF NORMAL FIRST PREGNANCY IN FIRST TRIMESTER: ICD-10-CM

## 2024-05-20 DIAGNOSIS — Z34.01 ENCOUNTER FOR SUPERVISION OF NORMAL FIRST PREGNANCY IN FIRST TRIMESTER: Primary | ICD-10-CM

## 2024-05-20 DIAGNOSIS — Z00.00 WELL ADULT EXAM: ICD-10-CM

## 2024-05-20 LAB
ABO GROUP BLD: NORMAL
ALBUMIN SERPL-MCNC: 4.1 G/DL (ref 3.5–5.2)
ALBUMIN/GLOB SERPL: 1.5 G/DL
ALP SERPL-CCNC: 27 U/L (ref 39–117)
ALT SERPL W P-5'-P-CCNC: 15 U/L (ref 1–33)
AMPHET+METHAMPHET UR QL: NEGATIVE
AMPHETAMINES UR QL: NEGATIVE
ANION GAP SERPL CALCULATED.3IONS-SCNC: 11 MMOL/L (ref 5–15)
AST SERPL-CCNC: 17 U/L (ref 1–32)
BARBITURATES UR QL SCN: NEGATIVE
BASOPHILS # BLD AUTO: 0.01 10*3/MM3 (ref 0–0.2)
BASOPHILS NFR BLD AUTO: 0.2 % (ref 0–1.5)
BENZODIAZ UR QL SCN: NEGATIVE
BILIRUB SERPL-MCNC: 0.4 MG/DL (ref 0–1.2)
BLD GP AB SCN SERPL QL: NEGATIVE
BUN SERPL-MCNC: 8 MG/DL (ref 6–20)
BUN/CREAT SERPL: 12.1 (ref 7–25)
BUPRENORPHINE SERPL-MCNC: NEGATIVE NG/ML
CALCIUM SPEC-SCNC: 8.8 MG/DL (ref 8.6–10.5)
CANNABINOIDS SERPL QL: NEGATIVE
CHLORIDE SERPL-SCNC: 105 MMOL/L (ref 98–107)
CHOLEST SERPL-MCNC: 145 MG/DL (ref 0–200)
CO2 SERPL-SCNC: 20 MMOL/L (ref 22–29)
COCAINE UR QL: NEGATIVE
CREAT SERPL-MCNC: 0.66 MG/DL (ref 0.57–1)
DEPRECATED RDW RBC AUTO: 41.4 FL (ref 37–54)
EGFRCR SERPLBLD CKD-EPI 2021: 119 ML/MIN/1.73
EOSINOPHIL # BLD AUTO: 0.02 10*3/MM3 (ref 0–0.4)
EOSINOPHIL NFR BLD AUTO: 0.3 % (ref 0.3–6.2)
ERYTHROCYTE [DISTWIDTH] IN BLOOD BY AUTOMATED COUNT: 12.2 % (ref 12.3–15.4)
EXTERNAL NIPT: NORMAL
FENTANYL UR-MCNC: NEGATIVE NG/ML
GLOBULIN UR ELPH-MCNC: 2.8 GM/DL
GLUCOSE SERPL-MCNC: 77 MG/DL (ref 65–99)
HBV SURFACE AG SERPL QL IA: NORMAL
HCT VFR BLD AUTO: 36 % (ref 34–46.6)
HCV AB SER QL: NORMAL
HDLC SERPL-MCNC: 67 MG/DL (ref 40–60)
HGB BLD-MCNC: 12.1 G/DL (ref 12–15.9)
HIV 1+2 AB+HIV1 P24 AG SERPL QL IA: NORMAL
IMM GRANULOCYTES # BLD AUTO: 0.01 10*3/MM3 (ref 0–0.05)
IMM GRANULOCYTES NFR BLD AUTO: 0.2 % (ref 0–0.5)
LDLC SERPL CALC-MCNC: 65 MG/DL (ref 0–100)
LDLC/HDLC SERPL: 0.98 {RATIO}
LYMPHOCYTES # BLD AUTO: 1.64 10*3/MM3 (ref 0.7–3.1)
LYMPHOCYTES NFR BLD AUTO: 25.8 % (ref 19.6–45.3)
MCH RBC QN AUTO: 31.2 PG (ref 26.6–33)
MCHC RBC AUTO-ENTMCNC: 33.6 G/DL (ref 31.5–35.7)
MCV RBC AUTO: 92.8 FL (ref 79–97)
METHADONE UR QL SCN: NEGATIVE
MONOCYTES # BLD AUTO: 0.26 10*3/MM3 (ref 0.1–0.9)
MONOCYTES NFR BLD AUTO: 4.1 % (ref 5–12)
NEUTROPHILS NFR BLD AUTO: 4.42 10*3/MM3 (ref 1.7–7)
NEUTROPHILS NFR BLD AUTO: 69.4 % (ref 42.7–76)
NRBC BLD AUTO-RTO: 0 /100 WBC (ref 0–0.2)
OPIATES UR QL: NEGATIVE
OXYCODONE UR QL SCN: NEGATIVE
PCP UR QL SCN: NEGATIVE
PLATELET # BLD AUTO: 217 10*3/MM3 (ref 140–450)
PMV BLD AUTO: 10.3 FL (ref 6–12)
POTASSIUM SERPL-SCNC: 3.9 MMOL/L (ref 3.5–5.2)
PROT SERPL-MCNC: 6.9 G/DL (ref 6–8.5)
RBC # BLD AUTO: 3.88 10*6/MM3 (ref 3.77–5.28)
RH BLD: POSITIVE
RPR SER QL: NORMAL
SODIUM SERPL-SCNC: 136 MMOL/L (ref 136–145)
TRICYCLICS UR QL SCN: NEGATIVE
TRIGL SERPL-MCNC: 63 MG/DL (ref 0–150)
TSH SERPL DL<=0.05 MIU/L-ACNC: 2.14 UIU/ML (ref 0.27–4.2)
VLDLC SERPL-MCNC: 13 MG/DL (ref 5–40)
WBC NRBC COR # BLD AUTO: 6.36 10*3/MM3 (ref 3.4–10.8)

## 2024-05-20 PROCEDURE — 36415 COLL VENOUS BLD VENIPUNCTURE: CPT

## 2024-05-20 PROCEDURE — 87491 CHLMYD TRACH DNA AMP PROBE: CPT

## 2024-05-20 PROCEDURE — 80307 DRUG TEST PRSMV CHEM ANLYZR: CPT

## 2024-05-20 PROCEDURE — 0501F PRENATAL FLOW SHEET: CPT | Performed by: OBSTETRICS & GYNECOLOGY

## 2024-05-20 PROCEDURE — 87591 N.GONORRHOEAE DNA AMP PROB: CPT

## 2024-05-20 PROCEDURE — 84443 ASSAY THYROID STIM HORMONE: CPT

## 2024-05-20 PROCEDURE — 80061 LIPID PANEL: CPT

## 2024-05-20 PROCEDURE — 80081 OBSTETRIC PANEL INC HIV TSTG: CPT

## 2024-05-20 PROCEDURE — 87086 URINE CULTURE/COLONY COUNT: CPT

## 2024-05-20 PROCEDURE — 80053 COMPREHEN METABOLIC PANEL: CPT

## 2024-05-20 PROCEDURE — 86803 HEPATITIS C AB TEST: CPT

## 2024-05-20 RX ORDER — PRENATAL VIT/IRON FUM/FOLIC AC 27MG-0.8MG
TABLET ORAL DAILY
COMMUNITY

## 2024-05-20 NOTE — PROGRESS NOTES
Subjective   Chief Complaint   Patient presents with    Initial Prenatal Visit       Kelli Giles is a 33 y.o. year old .  Patient's last menstrual period was 2024.  She presents to be seen to initiate prenatal care.     Social History    Tobacco Use      Smoking status: Never      Smokeless tobacco: Never      The following portions of the patient's history were reviewed and updated as appropriate:vital signs, allergies, current medications, past medical history, past social history, past surgical history, and problem list.    Objective   Lab Review   No data reviewed    Imaging   Pelvic ultrasound report    Assessment & Plan   ASSESSMENT  33 y.o. year old  at 12w5d  Supervision of low risk pregnancy    PLAN  The problem list for pregnancy was initiated today  Tests ordered today:  Orders Placed This Encounter   Procedures    Urine Culture - Urine, Urine, Clean Catch     Standing Status:   Future     Order Specific Question:   Release to patient     Answer:   Routine Release [[id number]]    Chlamydia trachomatis, Neisseria gonorrhoeae, PCR w/ confirmation - Urine, Urine, Clean Catch     Standing Status:   Future     Order Specific Question:   Release to patient     Answer:   Routine Release [[id number]]    Obstetric Panel     Standing Status:   Future     Order Specific Question:   Release to patient     Answer:   Routine Release [[id number]]    HIV-1 / O / 2 Ag / Antibody     Standing Status:   Future     Order Specific Question:   Release to patient     Answer:   Routine Release [[id number]]    Urine Drug Screen - Urine, Clean Catch     Please confirm all positive UDS     Standing Status:   Future     Order Specific Question:   Release to patient     Answer:   Routine Release [[id number]]    [facility] Prenatal Test: Chromosomes 13, 18, 21, X & Y: Triploidy 22Q.11.2 Deletion - Blood,     ==========Department Information==========    ID: [id number]    Department:[facility]  [facility]  [address]  [facility] [address]  Dept: [phone]  Dept Fax: [phone]  Loc: [phone]  Loc Fax: [phone]     Standing Status:   Future     Standing Expiration Date:   5/20/2025     Order Specific Question:   Is patient pregnant?     Answer:   Yes     Order Specific Question:   Expected due date (MM/DD/YYYY):     Answer:   11/27/2024     Order Specific Question:   Is this a twin pregnancy? (viable, no vanished twin)     Answer:   No     Order Specific Question:   Is this a surrogate or egg donor pregnancy?     Answer:   No     Order Specific Question:   I want fetal sex included in the report:     Answer:   Yes     Order Specific Question:   Patient's weight (lbs):     Answer:   121     Order Specific Question:   Opt out of 22q11.2?     Answer:   Yes     Order Specific Question:   Enroll this patient in the Automatic Redraw Program?     Answer:   Yes     Order Specific Question:   What type of billing?     Answer:   Bill Insurance     Order Specific Question:   Did patient sign the Patient Acknowledgement?     Answer:   Yes     Order Specific Question:   Did the ordering clinician sign the Statement of Informed Consent?     Answer:   Yes     Order Specific Question:   Blood draw managed by [facility]?     Answer:   No     Order Specific Question:   Release to patient     Answer:   Routine Release [[id number]]     Testing for GC / Chlamydia / trichomonas was done today  Genetic testing reviewed: NIPT ([facility]) and they have already decided to have testing performed.  Her vaccine record was reviewed and updated.  I discussed with Kelli that she may be behind on needed vaccinations for COVID booster / COVID bivalent booster.  She may be able to obtain these vaccinations at her local pharmacy OR speak about obtaining them with her primary care.  If she does obtain her vaccines, I have asked Kelli to let us know the date each vaccine was obtained so  that her medical record could be updated in our system.  Information reviewed: exercise in pregnancy, nutrition in pregnancy, weight gain in pregnancy, work and travel restrictions during pregnancy, list of OTC medications acceptable in pregnancy, and call coverage groups    Total time spent today with Kelli  was 30 minutes (level 4).  Off this time, 100% was spent face-to-face time coordinating care, answering her questions and counseling regarding exercise in pregnancy, nutrition in pregnancy, weight gain in pregnancy, work and travel restrictions during pregnancy, list of OTC medications acceptable in pregnancy and call coverage groups and pathophysiology of her presenting problem along with plans for any diagnositc work-up and treatment.  The time reported only includes face-to-face time and excludes any procedural based activities that occurred on the same date of service.    Follow up: 4 week(s)       This note was electronically signed.    Jacob Benton MD  May 20, 2024

## 2024-05-21 LAB
BACTERIA SPEC AEROBE CULT: NORMAL
RUBV IGG SERPL IA-ACNC: 1.39 INDEX

## 2024-05-22 LAB
C TRACH RRNA SPEC QL NAA+PROBE: NEGATIVE
N GONORRHOEA RRNA SPEC QL NAA+PROBE: NEGATIVE

## 2024-05-27 ENCOUNTER — DOCUMENTATION (OUTPATIENT)
Dept: OBSTETRICS AND GYNECOLOGY | Facility: CLINIC | Age: 34
End: 2024-05-27
Payer: COMMERCIAL

## 2024-05-27 LAB
Lab: NORMAL
NTRA FETAL FRACTION: NORMAL
NTRA GENDER OF FETUS: NORMAL
NTRA MONOSOMY X AGE-BASED RISK TEXT: NORMAL
NTRA MONOSOMY X RESULT TEXT: NORMAL
NTRA MONOSOMY X RISK SCORE TEXT: NORMAL
NTRA TRIPLOIDY RESULT TEXT: NORMAL
NTRA TRISOMY 13 AGE-BASED RISK TEXT: NORMAL
NTRA TRISOMY 13 RESULT TEXT: NORMAL
NTRA TRISOMY 13 RISK SCORE TEXT: NORMAL
NTRA TRISOMY 18 AGE-BASED RISK TEXT: NORMAL
NTRA TRISOMY 18 RESULT TEXT: NORMAL
NTRA TRISOMY 18 RISK SCORE TEXT: NORMAL
NTRA TRISOMY 21 AGE-BASED RISK TEXT: NORMAL
NTRA TRISOMY 21 RESULT TEXT: NORMAL
NTRA TRISOMY 21 RISK SCORE TEXT: NORMAL

## 2024-05-29 ENCOUNTER — TELEPHONE (OUTPATIENT)
Dept: OBSTETRICS AND GYNECOLOGY | Facility: CLINIC | Age: 34
End: 2024-05-29
Payer: COMMERCIAL

## 2024-06-21 ENCOUNTER — LAB (OUTPATIENT)
Dept: LAB | Facility: HOSPITAL | Age: 34
End: 2024-06-21
Payer: COMMERCIAL

## 2024-06-21 ENCOUNTER — ROUTINE PRENATAL (OUTPATIENT)
Dept: OBSTETRICS AND GYNECOLOGY | Facility: CLINIC | Age: 34
End: 2024-06-21
Payer: COMMERCIAL

## 2024-06-21 VITALS — SYSTOLIC BLOOD PRESSURE: 110 MMHG | WEIGHT: 125 LBS | DIASTOLIC BLOOD PRESSURE: 70 MMHG | BODY MASS INDEX: 22.86 KG/M2

## 2024-06-21 DIAGNOSIS — Z34.02 ENCOUNTER FOR SUPERVISION OF NORMAL FIRST PREGNANCY IN SECOND TRIMESTER: Primary | ICD-10-CM

## 2024-06-21 DIAGNOSIS — Z34.02 ENCOUNTER FOR SUPERVISION OF NORMAL FIRST PREGNANCY IN SECOND TRIMESTER: ICD-10-CM

## 2024-06-21 LAB — AFP INTERP SERPL-IMP: NORMAL

## 2024-06-21 NOTE — PROGRESS NOTES
Chief Complaint   Patient presents with    Routine Prenatal Visit       HPI: Kelli is a  currently at 17w2d who today reports the following:  Contractions - No; Leaking - No; Vaginal bleeding - No; Heartburn - No.    ROS:  GI: Nausea - No ; Constipation - No; Diarrhea - No    Neuro: Headache - No ; Visual change - No    Respiratory: Cough - No; SOB - No; fever - No     EXAM:  Vitals: See prenatal flowsheet   Abdomen: See prenatal flowsheet   Urine glucose/protein: See prenatal flowsheet   Pelvic: See prenatal flowsheet     Lab Results   Component Value Date    ABO A 2024    RH Positive 2024    ABSCRN Negative 2024       MDM:  Impression: Supervision of low risk pregnancy   Tests ordered/done today: AFP   Counseling: Prenatal labs reviewed  Today we discussed genetic testing.  She is aware that the AFP is a screening test.  A screening test is not a diagnostic test.  This means that a negative test does not guarantee an unaffected fetus and a positive test does not mean the fetus has the condition for which the test is being performed.  If the test returns positive, a diagnostic test should be consider to determine if the fetus in fact has the condition.  After considering the options previously presented, she is interested in having genetic testing performed.  Continue with Rx prenatal vitamins.  No additional counseling given - she has no specific complaints or concerns   Tests ordered today for her next visit:   U/S for anatomic screening

## 2024-06-26 ENCOUNTER — DOCUMENTATION (OUTPATIENT)
Dept: OBSTETRICS AND GYNECOLOGY | Facility: CLINIC | Age: 34
End: 2024-06-26
Payer: COMMERCIAL

## 2024-07-22 ENCOUNTER — ROUTINE PRENATAL (OUTPATIENT)
Dept: OBSTETRICS AND GYNECOLOGY | Facility: CLINIC | Age: 34
End: 2024-07-22
Payer: COMMERCIAL

## 2024-07-22 VITALS — WEIGHT: 129 LBS | SYSTOLIC BLOOD PRESSURE: 118 MMHG | DIASTOLIC BLOOD PRESSURE: 68 MMHG | BODY MASS INDEX: 23.59 KG/M2

## 2024-07-22 DIAGNOSIS — Z34.02 ENCOUNTER FOR SUPERVISION OF NORMAL FIRST PREGNANCY IN SECOND TRIMESTER: Primary | ICD-10-CM

## 2024-07-22 PROCEDURE — 0502F SUBSEQUENT PRENATAL CARE: CPT | Performed by: OBSTETRICS & GYNECOLOGY

## 2024-07-22 NOTE — PROGRESS NOTES
Chief Complaint   Patient presents with    Routine Prenatal Visit       HPI: Kelli is a  currently at 21w5d who today reports the following:  Contractions - No; Leaking - No; Vaginal bleeding - No; Heartburn - No.    ROS:  GI: Nausea - No ; Constipation - No; Diarrhea - No    Neuro: Headache - No ; Visual change - No    Respiratory: Cough - No; SOB - No; fever - No     EXAM:  Vitals: See prenatal flowsheet   Abdomen: See prenatal flowsheet   Urine glucose/protein: See prenatal flowsheet   Pelvic: See prenatal flowsheet     Lab Results   Component Value Date    ABO A 2024    RH Positive 2024    ABSCRN Negative 2024       MDM:  Impression: Supervision of low risk pregnancy   Tests ordered/done today: U/S for anatomic screening - anatomy completely seen today   Counseling: Prenatal labs reviewed  Continue with Rx prenatal vitamins.  No additional counseling given - she has no specific complaints or concerns   Tests ordered today for her next visit:   none

## 2024-08-12 ENCOUNTER — ROUTINE PRENATAL (OUTPATIENT)
Dept: OBSTETRICS AND GYNECOLOGY | Facility: CLINIC | Age: 34
End: 2024-08-12
Payer: COMMERCIAL

## 2024-08-12 VITALS — DIASTOLIC BLOOD PRESSURE: 70 MMHG | WEIGHT: 133 LBS | BODY MASS INDEX: 24.32 KG/M2 | SYSTOLIC BLOOD PRESSURE: 110 MMHG

## 2024-08-12 DIAGNOSIS — Z71.85 VACCINE COUNSELING: ICD-10-CM

## 2024-08-12 DIAGNOSIS — Z34.02 ENCOUNTER FOR SUPERVISION OF NORMAL FIRST PREGNANCY IN SECOND TRIMESTER: Primary | ICD-10-CM

## 2024-08-12 PROCEDURE — 0502F SUBSEQUENT PRENATAL CARE: CPT | Performed by: OBSTETRICS & GYNECOLOGY

## 2024-08-12 NOTE — PROGRESS NOTES
Chief Complaint   Patient presents with    Routine Prenatal Visit       HPI: Kelli is a  currently at 24w5d who today reports the following:  Contractions - No; Leaking - No; Vaginal bleeding - No; Heartburn - No.    ROS:  GI: Nausea - No ; Constipation - No; Diarrhea - No    Neuro: Headache - No ; Visual change - No    Respiratory: Cough - No; SOB - No; fever - No     EXAM:  Vitals: See prenatal flowsheet   Abdomen: See prenatal flowsheet   Urine glucose/protein: See prenatal flowsheet   Pelvic: See prenatal flowsheet     Lab Results   Component Value Date    ABO A 2024    RH Positive 2024    ABSCRN Negative 2024       MDM:  Impression: Supervision of low risk pregnancy   Tests ordered/done today: none   Counseling: Prenatal labs reviewed  Continue with Rx prenatal vitamins.  Breast feeding  Childbirth classes  Flu vaccine recommended at any gestational age  TDAP vaccination recommended between 27-36 weeks gestation OR after birth  RSV vaccine   Tests ordered today for her next visit:   GCT  HgB  RPR

## 2024-09-04 ENCOUNTER — ROUTINE PRENATAL (OUTPATIENT)
Dept: OBSTETRICS AND GYNECOLOGY | Facility: CLINIC | Age: 34
End: 2024-09-04
Payer: COMMERCIAL

## 2024-09-04 ENCOUNTER — LAB (OUTPATIENT)
Dept: LAB | Facility: HOSPITAL | Age: 34
End: 2024-09-04
Payer: COMMERCIAL

## 2024-09-04 VITALS — BODY MASS INDEX: 24.87 KG/M2 | SYSTOLIC BLOOD PRESSURE: 120 MMHG | WEIGHT: 136 LBS | DIASTOLIC BLOOD PRESSURE: 64 MMHG

## 2024-09-04 DIAGNOSIS — Z3A.28 28 WEEKS GESTATION OF PREGNANCY: Primary | ICD-10-CM

## 2024-09-04 DIAGNOSIS — Z71.85 VACCINE COUNSELING: ICD-10-CM

## 2024-09-04 DIAGNOSIS — Z34.03 ENCOUNTER FOR SUPERVISION OF NORMAL FIRST PREGNANCY IN THIRD TRIMESTER: ICD-10-CM

## 2024-09-04 DIAGNOSIS — Z34.02 ENCOUNTER FOR SUPERVISION OF NORMAL FIRST PREGNANCY IN SECOND TRIMESTER: Primary | ICD-10-CM

## 2024-09-04 DIAGNOSIS — Z23 NEED FOR DIPHTHERIA-TETANUS-PERTUSSIS (TDAP) VACCINE: ICD-10-CM

## 2024-09-04 LAB
COLOR UR: YELLOW
GLUCOSE 1H P 100 G GLC PO SERPL-MCNC: 136 MG/DL (ref 65–139)
GLUCOSE UR STRIP-MCNC: ABNORMAL MG/DL
HCT VFR BLD AUTO: 37.8 % (ref 34–46.6)
HGB BLD-MCNC: 12.8 G/DL (ref 12–15.9)
PROT UR STRIP-MCNC: NEGATIVE MG/DL
RPR SER QL: NORMAL

## 2024-09-04 PROCEDURE — 86592 SYPHILIS TEST NON-TREP QUAL: CPT

## 2024-09-04 PROCEDURE — 82948 REAGENT STRIP/BLOOD GLUCOSE: CPT

## 2024-09-04 PROCEDURE — 82950 GLUCOSE TEST: CPT

## 2024-09-04 PROCEDURE — 36415 COLL VENOUS BLD VENIPUNCTURE: CPT

## 2024-09-04 PROCEDURE — 85018 HEMOGLOBIN: CPT

## 2024-09-04 PROCEDURE — 85014 HEMATOCRIT: CPT

## 2024-09-04 NOTE — PROGRESS NOTES
Chief Complaint   Patient presents with    Routine Prenatal Visit       HPI: Kelli is a  currently at 28w0d who today reports the following: She reports good fetal movement.  Completed her 28-week labs today.   hemoglobin pending  Contractions - No; Leaking - No; Vaginal bleeding -  No; Heartburn - No.    ROS:  GI: Nausea - No ; Constipation - No; Diarrhea - No    Neuro: Headache - No ; Visual change - No      EXAM:  Vitals: See prenatal flowsheet   Abdomen: See prenatal flowsheet   Urine glucose/protein: See prenatal flowsheet   Pelvic: See prenatal flowsheet     Lab Results   Component Value Date    ABO A 2024    RH Positive 2024    ABSCRN Negative 2024       MDM:  Impression: Supervision of low risk pregnancy   Tests done today: Urine dip for protein and glucose  GCT  HgB  RPR   Topics discussed: Continue with PNV's  Prenatal labs reviewed   labor signs and symptoms  Symptoms of preeclampsia  TDAP vaccination recommended between 27-36 weeks gestation OR after birth  Kick counts reviewed  Discussed RSV vaccine between 32 and 36 weeks.  Discussed flu vaccine when available.   Tests scheduled today for her next visit:   none

## 2024-09-17 ENCOUNTER — ROUTINE PRENATAL (OUTPATIENT)
Dept: OBSTETRICS AND GYNECOLOGY | Facility: CLINIC | Age: 34
End: 2024-09-17
Payer: COMMERCIAL

## 2024-09-17 VITALS — WEIGHT: 140 LBS | BODY MASS INDEX: 25.6 KG/M2 | SYSTOLIC BLOOD PRESSURE: 112 MMHG | DIASTOLIC BLOOD PRESSURE: 74 MMHG

## 2024-09-17 DIAGNOSIS — Z34.03 ENCOUNTER FOR SUPERVISION OF NORMAL FIRST PREGNANCY IN THIRD TRIMESTER: Primary | ICD-10-CM

## 2024-09-17 LAB
GLUCOSE UR STRIP-MCNC: NEGATIVE MG/DL
PROT UR STRIP-MCNC: NEGATIVE MG/DL

## 2024-09-17 PROCEDURE — 0502F SUBSEQUENT PRENATAL CARE: CPT | Performed by: STUDENT IN AN ORGANIZED HEALTH CARE EDUCATION/TRAINING PROGRAM

## 2024-10-01 ENCOUNTER — ROUTINE PRENATAL (OUTPATIENT)
Dept: OBSTETRICS AND GYNECOLOGY | Facility: CLINIC | Age: 34
End: 2024-10-01
Payer: COMMERCIAL

## 2024-10-01 VITALS — DIASTOLIC BLOOD PRESSURE: 62 MMHG | WEIGHT: 140 LBS | BODY MASS INDEX: 25.6 KG/M2 | SYSTOLIC BLOOD PRESSURE: 118 MMHG

## 2024-10-01 DIAGNOSIS — Z34.03 ENCOUNTER FOR SUPERVISION OF NORMAL FIRST PREGNANCY IN THIRD TRIMESTER: Primary | ICD-10-CM

## 2024-10-01 LAB
GLUCOSE UR STRIP-MCNC: NEGATIVE MG/DL
PROT UR STRIP-MCNC: NEGATIVE MG/DL

## 2024-10-01 PROCEDURE — 0502F SUBSEQUENT PRENATAL CARE: CPT | Performed by: OBSTETRICS & GYNECOLOGY

## 2024-10-01 NOTE — PROGRESS NOTES
Chief Complaint   Patient presents with    Routine Prenatal Visit       HPI: eKlli is a  currently at 31w6d who today reports the following:  Contractions - No; Leaking - No; Vaginal bleeding -  No; Swelling of extremities - No.    ROS:  GI: Nausea - No; Constipation - No; Diarrhea - No    Neuro: Headache - No; Visual change - No    Respiratory: Cough - No; SOB - No; fever - No     EXAM:  Vitals: See prenatal flowsheet   Abdomen: See prenatal flowsheet   Urine glucose/protein: See prenatal flowsheet   Pelvic: See prenatal flowsheet   MDM:   Impression: Supervision of low risk pregnancy   Tests done today: Urine dip for protein and glucose   Topics discussed: Prenatal labs reviewed  Continue with Rx prenatal vitamins.  Pediatrician selection   Tests scheduled today for her next visit:   none

## 2024-10-15 ENCOUNTER — ROUTINE PRENATAL (OUTPATIENT)
Dept: OBSTETRICS AND GYNECOLOGY | Facility: CLINIC | Age: 34
End: 2024-10-15
Payer: COMMERCIAL

## 2024-10-15 VITALS — DIASTOLIC BLOOD PRESSURE: 60 MMHG | WEIGHT: 144.8 LBS | SYSTOLIC BLOOD PRESSURE: 106 MMHG | BODY MASS INDEX: 26.48 KG/M2

## 2024-10-15 DIAGNOSIS — Z3A.33 33 WEEKS GESTATION OF PREGNANCY: Primary | ICD-10-CM

## 2024-10-15 DIAGNOSIS — Z71.85 VACCINE COUNSELING: ICD-10-CM

## 2024-10-15 DIAGNOSIS — Z34.03 ENCOUNTER FOR SUPERVISION OF NORMAL FIRST PREGNANCY IN THIRD TRIMESTER: ICD-10-CM

## 2024-10-15 LAB
COLOR UR: YELLOW
GLUCOSE UR STRIP-MCNC: NEGATIVE MG/DL
PROT UR STRIP-MCNC: NEGATIVE MG/DL

## 2024-10-15 NOTE — PROGRESS NOTES
Chief Complaint   Patient presents with    Routine Prenatal Visit     Ob follow up and No c/c       HPI: Kelli is a  currently at 33w6d who today reports the following:She reports good fetal movement. She is planning trip to [place] in the next few weeks.   Contractions - No; Leaking - No; Vaginal bleeding -  No; Swelling of extremities - No.    ROS:  GI: Nausea - No; Constipation - No; Diarrhea - No    Neuro: Headache - No; Visual change - No      EXAM:  Vitals: See prenatal flowsheet   Abdomen: See prenatal flowsheet   Urine glucose/protein: See prenatal flowsheet   Pelvic: See prenatal flowsheet   MDM:   Impression: Supervision of low risk pregnancy   Tests done today: Urine dip for protein and glucose   Topics discussed: Continue with PNV's  Prenatal labs reviewed   labor signs and symptoms  Symptoms of preeclampsia  Kick counts reviewed  Travel precautions reviewed  Discussed RSV vaccine.  She will call insurance to check coverage and is planning on getting her vaccine at next visit  Discussed flu vaccine and any gestation she is considering.  Discussed Tdap vaccine currently declines.   Tests scheduled today for her next visit:   none

## 2024-10-29 ENCOUNTER — ROUTINE PRENATAL (OUTPATIENT)
Dept: OBSTETRICS AND GYNECOLOGY | Facility: CLINIC | Age: 34
End: 2024-10-29
Payer: COMMERCIAL

## 2024-10-29 VITALS — WEIGHT: 148 LBS | DIASTOLIC BLOOD PRESSURE: 68 MMHG | SYSTOLIC BLOOD PRESSURE: 124 MMHG | BODY MASS INDEX: 27.06 KG/M2

## 2024-10-29 DIAGNOSIS — Z34.03 ENCOUNTER FOR SUPERVISION OF NORMAL FIRST PREGNANCY IN THIRD TRIMESTER: Primary | ICD-10-CM

## 2024-10-29 PROCEDURE — 0502F SUBSEQUENT PRENATAL CARE: CPT | Performed by: OBSTETRICS & GYNECOLOGY

## 2024-10-29 PROCEDURE — 90678 RSV VACC PREF BIVALENT IM: CPT | Performed by: OBSTETRICS & GYNECOLOGY

## 2024-10-29 PROCEDURE — 90471 IMMUNIZATION ADMIN: CPT | Performed by: OBSTETRICS & GYNECOLOGY

## 2024-10-29 NOTE — PROGRESS NOTES
Chief Complaint   Patient presents with    Routine Prenatal Visit       HPI: Kelli is a  currently at 35w6d who today reports the following:  Contractions - No; Leaking - No; Vaginal bleeding -  No; Swelling of extremities - No.    ROS:  GI: Nausea - No; Constipation - No; Diarrhea - No    Neuro: Headache - No; Visual change - No    Respiratory: Cough - No; SOB - No; fever - No     EXAM:  Vitals: See prenatal flowsheet   Abdomen: See prenatal flowsheet   Urine glucose/protein: See prenatal flowsheet   Pelvic: See prenatal flowsheet   MDM:   Impression: Supervision of low risk pregnancy   Tests done today: Urine dip for protein and glucose   Topics discussed: Prenatal labs reviewed  Continue with Rx prenatal vitamins.  No additional counseling given - she has no specific complaints or concerns   Tests scheduled today for her next visit:   none

## 2024-11-05 ENCOUNTER — ROUTINE PRENATAL (OUTPATIENT)
Dept: OBSTETRICS AND GYNECOLOGY | Facility: CLINIC | Age: 34
End: 2024-11-05
Payer: COMMERCIAL

## 2024-11-05 VITALS — DIASTOLIC BLOOD PRESSURE: 72 MMHG | SYSTOLIC BLOOD PRESSURE: 118 MMHG | WEIGHT: 148 LBS | BODY MASS INDEX: 27.06 KG/M2

## 2024-11-05 DIAGNOSIS — Z34.03 ENCOUNTER FOR SUPERVISION OF NORMAL FIRST PREGNANCY IN THIRD TRIMESTER: Primary | ICD-10-CM

## 2024-11-05 PROCEDURE — 0502F SUBSEQUENT PRENATAL CARE: CPT | Performed by: OBSTETRICS & GYNECOLOGY

## 2024-11-05 RX ORDER — LIDOCAINE HYDROCHLORIDE 10 MG/ML
0.5 INJECTION, SOLUTION EPIDURAL; INFILTRATION; INTRACAUDAL; PERINEURAL ONCE AS NEEDED
Status: CANCELLED | OUTPATIENT
Start: 2024-11-05

## 2024-11-05 RX ORDER — ONDANSETRON 2 MG/ML
4 INJECTION INTRAMUSCULAR; INTRAVENOUS EVERY 8 HOURS PRN
Status: CANCELLED | OUTPATIENT
Start: 2024-11-05

## 2024-11-05 RX ORDER — BISACODYL 10 MG
10 SUPPOSITORY, RECTAL RECTAL DAILY PRN
Status: CANCELLED | OUTPATIENT
Start: 2024-11-05

## 2024-11-05 RX ORDER — ONDANSETRON 4 MG/1
8 TABLET, ORALLY DISINTEGRATING ORAL EVERY 8 HOURS PRN
Status: CANCELLED | OUTPATIENT
Start: 2024-11-05

## 2024-11-05 RX ORDER — DOCUSATE SODIUM 100 MG/1
100 CAPSULE, LIQUID FILLED ORAL 2 TIMES DAILY PRN
Status: CANCELLED | OUTPATIENT
Start: 2024-11-05

## 2024-11-05 RX ORDER — SODIUM CHLORIDE 0.9 % (FLUSH) 0.9 %
10 SYRINGE (ML) INJECTION AS NEEDED
Status: CANCELLED | OUTPATIENT
Start: 2024-11-05

## 2024-11-05 RX ORDER — ACETAMINOPHEN 325 MG/1
650 TABLET ORAL EVERY 4 HOURS PRN
Status: CANCELLED | OUTPATIENT
Start: 2024-11-05

## 2024-11-05 RX ORDER — SODIUM CHLORIDE 9 MG/ML
40 INJECTION, SOLUTION INTRAVENOUS AS NEEDED
Status: CANCELLED | OUTPATIENT
Start: 2024-11-05

## 2024-11-05 RX ORDER — SODIUM CHLORIDE 0.9 % (FLUSH) 0.9 %
10 SYRINGE (ML) INJECTION EVERY 12 HOURS SCHEDULED
Status: CANCELLED | OUTPATIENT
Start: 2024-11-05

## 2024-11-05 NOTE — PROGRESS NOTES
Ultrasound confirms breech  Options discussed including expectant management with a low rate of spontaneous conversion to vertex  External cephalic version reviewed with risks of fetal distress being small and success rate of 70% with small risks of reconversion to breech  Also discussed knee-chest position and Moxib[employer]  After hearing this information, she is agreeable to proceed with external cephalic version

## 2024-11-05 NOTE — PROGRESS NOTES
Chief Complaint   Patient presents with    Routine Prenatal Visit       HPI: Kelli is a  currently at 36w6d who today reports the following:  Contractions - No; Leaking - No; Vaginal bleeding -  No; Swelling of extremities - No.    ROS:  GI: Nausea - No; Constipation - No; Diarrhea - No    Neuro: Headache - No; Visual change - No    Respiratory: Cough - No; SOB - No; fever - No     EXAM:  Vitals: See prenatal flowsheet   Abdomen: See prenatal flowsheet   Urine glucose/protein: See prenatal flowsheet   Pelvic: See prenatal flowsheet   MDM:   Impression: Supervision of low risk pregnancy  Suspected vertex presentation but cannot confirm   Tests done today: Urine dip for protein and glucose  GBS testing   Topics discussed: Prenatal labs reviewed  Continue with Rx prenatal vitamins.  Assuming ultrasound today confirms vertex no follow-up needed other than 1 week   Tests scheduled today for her next visit:   none

## 2024-11-08 ENCOUNTER — HOSPITAL ENCOUNTER (OUTPATIENT)
Facility: HOSPITAL | Age: 34
Discharge: HOME | End: 2024-11-08
Attending: OBSTETRICS & GYNECOLOGY | Admitting: OBSTETRICS & GYNECOLOGY
Payer: COMMERCIAL

## 2024-11-08 ENCOUNTER — PREP FOR SURGERY (OUTPATIENT)
Dept: OTHER | Facility: HOSPITAL | Age: 34
End: 2024-11-08
Payer: COMMERCIAL

## 2024-11-08 VITALS
OXYGEN SATURATION: 96 % | HEIGHT: 62 IN | RESPIRATION RATE: 18 BRPM | WEIGHT: 149 LBS | HEART RATE: 70 BPM | TEMPERATURE: 98 F | DIASTOLIC BLOOD PRESSURE: 75 MMHG | SYSTOLIC BLOOD PRESSURE: 107 MMHG | BODY MASS INDEX: 27.42 KG/M2

## 2024-11-08 DIAGNOSIS — Z34.03 ENCOUNTER FOR SUPERVISION OF NORMAL FIRST PREGNANCY IN THIRD TRIMESTER: ICD-10-CM

## 2024-11-08 LAB
ABO GROUP BLD: NORMAL
BLD GP AB SCN SERPL QL: NEGATIVE
DEPRECATED RDW RBC AUTO: 40.1 FL (ref 37–54)
ERYTHROCYTE [DISTWIDTH] IN BLOOD BY AUTOMATED COUNT: 12.3 % (ref 12.3–15.4)
HCT VFR BLD AUTO: 38.7 % (ref 34–46.6)
HGB BLD-MCNC: 13.6 G/DL (ref 12–15.9)
MCH RBC QN AUTO: 31.6 PG (ref 26.6–33)
MCHC RBC AUTO-ENTMCNC: 35.1 G/DL (ref 31.5–35.7)
MCV RBC AUTO: 89.8 FL (ref 79–97)
PLATELET # BLD AUTO: 176 10*3/MM3 (ref 140–450)
PMV BLD AUTO: 11.6 FL (ref 6–12)
RBC # BLD AUTO: 4.31 10*6/MM3 (ref 3.77–5.28)
RH BLD: POSITIVE
T&S EXPIRATION DATE: NORMAL
TREPONEMA PALLIDUM IGG+IGM AB [PRESENCE] IN SERUM OR PLASMA BY IMMUNOASSAY: NORMAL
WBC NRBC COR # BLD AUTO: 7.98 10*3/MM3 (ref 3.4–10.8)

## 2024-11-08 PROCEDURE — 59412 ANTEPARTUM MANIPULATION: CPT | Performed by: OBSTETRICS & GYNECOLOGY

## 2024-11-08 PROCEDURE — 85027 COMPLETE CBC AUTOMATED: CPT | Performed by: OBSTETRICS & GYNECOLOGY

## 2024-11-08 PROCEDURE — 59412 ANTEPARTUM MANIPULATION: CPT

## 2024-11-08 PROCEDURE — G0378 HOSPITAL OBSERVATION PER HR: HCPCS

## 2024-11-08 PROCEDURE — 96372 THER/PROPH/DIAG INJ SC/IM: CPT

## 2024-11-08 PROCEDURE — 86850 RBC ANTIBODY SCREEN: CPT | Performed by: OBSTETRICS & GYNECOLOGY

## 2024-11-08 PROCEDURE — 59025 FETAL NON-STRESS TEST: CPT

## 2024-11-08 PROCEDURE — 86780 TREPONEMA PALLIDUM: CPT | Performed by: OBSTETRICS & GYNECOLOGY

## 2024-11-08 PROCEDURE — 86901 BLOOD TYPING SEROLOGIC RH(D): CPT | Performed by: OBSTETRICS & GYNECOLOGY

## 2024-11-08 PROCEDURE — 86900 BLOOD TYPING SEROLOGIC ABO: CPT | Performed by: OBSTETRICS & GYNECOLOGY

## 2024-11-08 PROCEDURE — 25010000002 TERBUTALINE PER 1 MG: Performed by: OBSTETRICS & GYNECOLOGY

## 2024-11-08 RX ORDER — SODIUM CHLORIDE 0.9 % (FLUSH) 0.9 %
10 SYRINGE (ML) INJECTION AS NEEDED
Status: DISCONTINUED | OUTPATIENT
Start: 2024-11-08 | End: 2024-11-08 | Stop reason: HOSPADM

## 2024-11-08 RX ORDER — OXYTOCIN/0.9 % SODIUM CHLORIDE 30/500 ML
650 PLASTIC BAG, INJECTION (ML) INTRAVENOUS ONCE
OUTPATIENT
Start: 2024-11-08 | End: 2024-11-08

## 2024-11-08 RX ORDER — LIDOCAINE HYDROCHLORIDE 10 MG/ML
0.5 INJECTION, SOLUTION EPIDURAL; INFILTRATION; INTRACAUDAL; PERINEURAL ONCE AS NEEDED
OUTPATIENT
Start: 2024-11-08

## 2024-11-08 RX ORDER — KETOROLAC TROMETHAMINE 30 MG/ML
30 INJECTION, SOLUTION INTRAMUSCULAR; INTRAVENOUS ONCE
OUTPATIENT
Start: 2024-11-08 | End: 2024-11-08

## 2024-11-08 RX ORDER — ACETAMINOPHEN 325 MG/1
650 TABLET ORAL EVERY 4 HOURS PRN
Status: DISCONTINUED | OUTPATIENT
Start: 2024-11-08 | End: 2024-11-08 | Stop reason: HOSPADM

## 2024-11-08 RX ORDER — DOCUSATE SODIUM 100 MG/1
100 CAPSULE, LIQUID FILLED ORAL 2 TIMES DAILY PRN
Status: DISCONTINUED | OUTPATIENT
Start: 2024-11-08 | End: 2024-11-08 | Stop reason: HOSPADM

## 2024-11-08 RX ORDER — OXYTOCIN/0.9 % SODIUM CHLORIDE 30/500 ML
85 PLASTIC BAG, INJECTION (ML) INTRAVENOUS ONCE
OUTPATIENT
Start: 2024-11-08 | End: 2024-11-08

## 2024-11-08 RX ORDER — LIDOCAINE HYDROCHLORIDE 10 MG/ML
0.5 INJECTION, SOLUTION EPIDURAL; INFILTRATION; INTRACAUDAL; PERINEURAL ONCE AS NEEDED
Status: DISCONTINUED | OUTPATIENT
Start: 2024-11-08 | End: 2024-11-08 | Stop reason: HOSPADM

## 2024-11-08 RX ORDER — TERBUTALINE SULFATE 1 MG/ML
0.25 INJECTION, SOLUTION SUBCUTANEOUS ONCE
Status: COMPLETED | OUTPATIENT
Start: 2024-11-08 | End: 2024-11-08

## 2024-11-08 RX ORDER — MISOPROSTOL 200 UG/1
800 TABLET ORAL AS NEEDED
OUTPATIENT
Start: 2024-11-08

## 2024-11-08 RX ORDER — SODIUM CHLORIDE, SODIUM LACTATE, POTASSIUM CHLORIDE, CALCIUM CHLORIDE 600; 310; 30; 20 MG/100ML; MG/100ML; MG/100ML; MG/100ML
125 INJECTION, SOLUTION INTRAVENOUS CONTINUOUS
OUTPATIENT
Start: 2024-11-08 | End: 2024-11-09

## 2024-11-08 RX ORDER — BUPIVACAINE HCL/0.9 % NACL/PF 0.1 %
2 PLASTIC BAG, INJECTION (ML) EPIDURAL ONCE
OUTPATIENT
Start: 2024-11-08 | End: 2024-11-08

## 2024-11-08 RX ORDER — SODIUM CHLORIDE 0.9 % (FLUSH) 0.9 %
10 SYRINGE (ML) INJECTION EVERY 12 HOURS SCHEDULED
Status: DISCONTINUED | OUTPATIENT
Start: 2024-11-08 | End: 2024-11-08 | Stop reason: HOSPADM

## 2024-11-08 RX ORDER — PROMETHAZINE HYDROCHLORIDE 12.5 MG/1
12.5 TABLET ORAL EVERY 6 HOURS PRN
OUTPATIENT
Start: 2024-11-08

## 2024-11-08 RX ORDER — SODIUM CHLORIDE 9 MG/ML
40 INJECTION, SOLUTION INTRAVENOUS AS NEEDED
OUTPATIENT
Start: 2024-11-08

## 2024-11-08 RX ORDER — CITRIC ACID/SODIUM CITRATE 334-500MG
30 SOLUTION, ORAL ORAL ONCE
OUTPATIENT
Start: 2024-11-08 | End: 2024-11-08

## 2024-11-08 RX ORDER — SODIUM CHLORIDE 0.9 % (FLUSH) 0.9 %
10 SYRINGE (ML) INJECTION AS NEEDED
OUTPATIENT
Start: 2024-11-08

## 2024-11-08 RX ORDER — ONDANSETRON 4 MG/1
8 TABLET, ORALLY DISINTEGRATING ORAL EVERY 8 HOURS PRN
Status: DISCONTINUED | OUTPATIENT
Start: 2024-11-08 | End: 2024-11-08 | Stop reason: HOSPADM

## 2024-11-08 RX ORDER — CARBOPROST TROMETHAMINE 250 UG/ML
250 INJECTION, SOLUTION INTRAMUSCULAR AS NEEDED
OUTPATIENT
Start: 2024-11-08

## 2024-11-08 RX ORDER — METHYLERGONOVINE MALEATE 0.2 MG/ML
200 INJECTION INTRAVENOUS ONCE AS NEEDED
OUTPATIENT
Start: 2024-11-08

## 2024-11-08 RX ORDER — BISACODYL 10 MG
10 SUPPOSITORY, RECTAL RECTAL DAILY PRN
Status: DISCONTINUED | OUTPATIENT
Start: 2024-11-08 | End: 2024-11-08 | Stop reason: HOSPADM

## 2024-11-08 RX ORDER — SODIUM CHLORIDE 0.9 % (FLUSH) 0.9 %
10 SYRINGE (ML) INJECTION EVERY 12 HOURS SCHEDULED
OUTPATIENT
Start: 2024-11-08

## 2024-11-08 RX ORDER — PROMETHAZINE HYDROCHLORIDE 12.5 MG/1
12.5 SUPPOSITORY RECTAL EVERY 6 HOURS PRN
OUTPATIENT
Start: 2024-11-08

## 2024-11-08 RX ORDER — SODIUM CHLORIDE 9 MG/ML
40 INJECTION, SOLUTION INTRAVENOUS AS NEEDED
Status: DISCONTINUED | OUTPATIENT
Start: 2024-11-08 | End: 2024-11-08 | Stop reason: HOSPADM

## 2024-11-08 RX ORDER — ACETAMINOPHEN 500 MG
1000 TABLET ORAL ONCE
OUTPATIENT
Start: 2024-11-08 | End: 2024-11-08

## 2024-11-08 RX ORDER — ONDANSETRON 2 MG/ML
4 INJECTION INTRAMUSCULAR; INTRAVENOUS EVERY 8 HOURS PRN
Status: DISCONTINUED | OUTPATIENT
Start: 2024-11-08 | End: 2024-11-08 | Stop reason: HOSPADM

## 2024-11-08 RX ADMIN — TERBUTALINE SULFATE 0.25 MG: 1 INJECTION SUBCUTANEOUS at 07:43

## 2024-11-08 NOTE — H&P
"[facility]  Kelli Giles  : 1990  MRN: [id number]  CSN: [id number]    History and Physical    Subjective   Chief Complaint   Patient presents with    External Cephalic Version     Kelli Giles is a 34 y.o. year old  with an Estimated Date of Delivery: 24 currently at 37w2d presenting for external cephalic version.    Prenatal care has been with Dr. Benton.  It has been benign.    OB History    Para Term  AB Living   1 0 0 0 0 0   SAB IAB Ectopic Molar Multiple Live Births   0 0 0 0 0 0      # Outcome Date GA Lbr Harjinder/2nd Weight Sex Type Anes PTL Lv   1 Current                Past Medical History:   Diagnosis Date    Kyleena 10/2019    Placed on 10/28/2019 and removed on 23       No past surgical history on file.    Medications Prior to Admission   Medication Sig Dispense Refill Last Dose/Taking    Prenatal Vit-Fe Fumarate-FA (prenatal vitamin 27-0.8) 27-0.8 MG tablet tablet Take  by mouth Daily.   2024       Allergies   Allergen Reactions    Penicillins Swelling    Sulfa Antibiotics Rash     Social History    Tobacco Use      Smoking status: Never        Passive exposure: Never      Smokeless tobacco: Never    Review of Systems   Constitutional:  Negative for unexpected weight change.   Respiratory:  Negative for cough and shortness of breath.    Cardiovascular:  Negative for chest pain.   Gastrointestinal:  Negative for abdominal distention, constipation and diarrhea.        No persistent bloating   Genitourinary:  Negative for difficulty urinating, dysuria, hematuria and urgency.        No significant incontinence   Skin:  Negative for rash.   Neurological:  Negative for headaches.         Objective   Ht 157.5 cm (62\")   Wt 67.6 kg (149 lb)   LMP 2024   BMI 27.25 kg/m²   General: well developed; well nourished  no acute distress  mentation appropriate   Abdomen: soft, non-tender; no masses  no umbilical or inguinal hernias are present  no " hepato-splenomegaly     Prenatal Labs  Lab Results   Component Value Date    HGB 12.8 09/04/2024    RUBELLAABIGG 1.39 05/20/2024    HEPBSAG Non-Reactive 05/20/2024    ABSCRN Negative 05/20/2024    [id number] Non-Reactive 05/20/2024    HEPCVIRUSABY Non-Reactive 05/20/2024     09/04/2024    URINECX 25,000 CFU/mL Normal Urogenital Makayla 05/20/2024    CHLAMNAA Negative 05/20/2024    NGONORRHON Negative 05/20/2024          Assessment   IUP at 37w2d  Breech presentation     Plan   Nonstress test upon admission and ultrasound to confirm persistent breech  Brethine for uterine relaxation  External cephalic version    Jacob Benton MD  11/8/2024  06:58 EST

## 2024-11-08 NOTE — PROGRESS NOTES
After obtaining a reactive nonstress test decision made to proceed with external cephalic version, confirming breech.  Ultrasound shows head of the right upper quadrant with spine on the left side.  Initial attempts at version using a backwards flip unsuccessful.  Subsequent attempted external version using a forward flip also unsuccessful.  Fetal heart rate monitored during the procedure and no evidence of bradycardia.  Procedure  terminated  Will monitor for 1 to 2 hours and assuming no signs of fetal compromise plan discharge home  Explained small risks of fetal distress in the coming days and if she starts to notice bleeding, leaking of fluid or increased contractions, instructed to come back to labor and delivery properly.  Will schedule  section for between 39 and 40 weeks.      Jacob Benton M.D.  2024  08:11 EST

## 2024-11-08 NOTE — DISCHARGE SUMMARY
MARIL[facility]   Kelli Giles  : 1990  MRN: [id number]  CSN: [id number]    Discharge Summary    A formal discharge summary was not needed because this admission was for an observation visit.    Discharge Date: 24   Discharge Dx:    IUP @ 37w2d  Breech presentation   Disposition: home   Condition at discharge: stable   Follow up: On Tuesday as already scheduled         This note has been electronically signed.    Jacob Benton MD

## 2024-11-12 ENCOUNTER — ROUTINE PRENATAL (OUTPATIENT)
Dept: OBSTETRICS AND GYNECOLOGY | Facility: CLINIC | Age: 34
End: 2024-11-12
Payer: COMMERCIAL

## 2024-11-12 VITALS — SYSTOLIC BLOOD PRESSURE: 110 MMHG | DIASTOLIC BLOOD PRESSURE: 76 MMHG | WEIGHT: 146 LBS | BODY MASS INDEX: 26.7 KG/M2

## 2024-11-12 DIAGNOSIS — Z34.03 ENCOUNTER FOR SUPERVISION OF NORMAL FIRST PREGNANCY IN THIRD TRIMESTER: Primary | ICD-10-CM

## 2024-11-12 LAB
GLUCOSE UR STRIP-MCNC: NEGATIVE MG/DL
PROT UR STRIP-MCNC: NEGATIVE MG/DL

## 2024-11-12 NOTE — PROGRESS NOTES
Chief Complaint   Patient presents with    Routine Prenatal Visit       HPI: Kelli is a  currently at 37w6d who today reports the following:  Contractions - No; Leaking - No; Vaginal bleeding -  No; Swelling of extremities - No.    ROS:  GI: Nausea - No; Constipation - No; Diarrhea - No    Neuro: Headache - No; Visual change - No    Respiratory: Cough - No; SOB - No; fever - No     EXAM:  Vitals: See prenatal flowsheet   Abdomen: See prenatal flowsheet   Urine glucose/protein: See prenatal flowsheet   Pelvic: See prenatal flowsheet   MDM:   Impression: Supervision of high risk pregnancy  Breech presentation failed external cephalic version   Tests done today: Urine dip for protein and glucose   Topics discussed: Prenatal labs reviewed  Continue with Rx prenatal vitamins.  No shaving in advanced of scheduled    Tests scheduled today for her next visit:   none

## 2024-11-19 ENCOUNTER — ROUTINE PRENATAL (OUTPATIENT)
Dept: OBSTETRICS AND GYNECOLOGY | Facility: CLINIC | Age: 34
End: 2024-11-19
Payer: COMMERCIAL

## 2024-11-19 VITALS — BODY MASS INDEX: 27.07 KG/M2 | SYSTOLIC BLOOD PRESSURE: 116 MMHG | WEIGHT: 148 LBS | DIASTOLIC BLOOD PRESSURE: 68 MMHG

## 2024-11-19 DIAGNOSIS — Z34.03 ENCOUNTER FOR SUPERVISION OF NORMAL FIRST PREGNANCY IN THIRD TRIMESTER: Primary | ICD-10-CM

## 2024-11-19 LAB
GLUCOSE UR STRIP-MCNC: NEGATIVE MG/DL
PROT UR STRIP-MCNC: NEGATIVE MG/DL

## 2024-11-19 NOTE — PROGRESS NOTES
Chief Complaint   Patient presents with    Routine Prenatal Visit       HPI: Kelli is a  currently at 38w6d who today reports the following:  Contractions - No; Leaking - No; Vaginal bleeding -  No; Swelling of extremities - No.    ROS:  GI: Nausea - No; Constipation - No; Diarrhea - No    Neuro: Headache - No; Visual change - No    Respiratory: Cough - No; SOB - No; fever - No     EXAM:  Vitals: See prenatal flowsheet   Abdomen: See prenatal flowsheet   Urine glucose/protein: See prenatal flowsheet   Pelvic: See prenatal flowsheet   MDM:   Impression: Supervision of high risk pregnancy  Persistent breech   Tests done today: Urine dip for protein and glucose   Topics discussed: Prenatal labs reviewed  Continue with Rx prenatal vitamins.  ERAS - acetaminophen and Gatorade/G2   Tests scheduled today for her next visit:   none

## 2024-11-22 ENCOUNTER — PRE-ADMISSION TESTING (OUTPATIENT)
Dept: PREADMISSION TESTING | Facility: HOSPITAL | Age: 34
End: 2024-11-22
Payer: COMMERCIAL

## 2024-11-22 VITALS — HEIGHT: 63 IN | BODY MASS INDEX: 25.98 KG/M2 | WEIGHT: 146.61 LBS

## 2024-11-22 LAB
ABO GROUP BLD: NORMAL
BLD GP AB SCN SERPL QL: NEGATIVE
DEPRECATED RDW RBC AUTO: 41.8 FL (ref 37–54)
ERYTHROCYTE [DISTWIDTH] IN BLOOD BY AUTOMATED COUNT: 12.6 % (ref 12.3–15.4)
HCT VFR BLD AUTO: 40.2 % (ref 34–46.6)
HGB BLD-MCNC: 13.9 G/DL (ref 12–15.9)
MCH RBC QN AUTO: 31.4 PG (ref 26.6–33)
MCHC RBC AUTO-ENTMCNC: 34.6 G/DL (ref 31.5–35.7)
MCV RBC AUTO: 91 FL (ref 79–97)
PLATELET # BLD AUTO: 183 10*3/MM3 (ref 140–450)
PMV BLD AUTO: 11.9 FL (ref 6–12)
RBC # BLD AUTO: 4.42 10*6/MM3 (ref 3.77–5.28)
RH BLD: POSITIVE
T&S EXPIRATION DATE: NORMAL
TREPONEMA PALLIDUM IGG+IGM AB [PRESENCE] IN SERUM OR PLASMA BY IMMUNOASSAY: NORMAL
WBC NRBC COR # BLD AUTO: 7.7 10*3/MM3 (ref 3.4–10.8)

## 2024-11-22 PROCEDURE — 86900 BLOOD TYPING SEROLOGIC ABO: CPT

## 2024-11-22 PROCEDURE — 36415 COLL VENOUS BLD VENIPUNCTURE: CPT

## 2024-11-22 PROCEDURE — 86901 BLOOD TYPING SEROLOGIC RH(D): CPT

## 2024-11-22 PROCEDURE — 86850 RBC ANTIBODY SCREEN: CPT

## 2024-11-22 PROCEDURE — 86780 TREPONEMA PALLIDUM: CPT | Performed by: OBSTETRICS & GYNECOLOGY

## 2024-11-22 PROCEDURE — 85027 COMPLETE CBC AUTOMATED: CPT

## 2024-11-22 NOTE — DISCHARGE INSTRUCTIONS
What to know before your arrive:    -Do not eat, drink or chew gum beginning 8 hours before your scheduled arrival time to the hospital.  Except please drink 20 ounces of Gatorade and complete two hours before your given arrival time to the hospital.  If you drink too close to surgery time, your sugery may  be delayed or cancelled.  Please complete as instructed.     -Do not shave any part of your body including abdomen or pelvic are for two days before your procedure.  -If you are taking a scheduled medication (insulin, blood pressure medicine,antibiotics) please consult with your physician whether to take on the day of surgery.  -Remove all jewelry including rings, wedding bands, and piercing before coming to the hospital.  -Leave important valuables at home.  -Do not wear dark fingernail polish.  -Please take two Tylenol 500 mg tablets the evening before surgery.  -Bring the following with you to the hospital:    -Picture ID and insurance, Medicare or Medicaid cards    -Co-pay/deductible required by insurance (Cash, Check, Credit Card)    -Copy of living will or power  document (if applicable)    -CPAP mask and tubing, not machine (if applicable)    -Skin prep instructions sheet    What to know the day of procedure:    -Park in the [facility], take elevator for first floor, exit to the right and  proceed through the doors to outside, follow the covered sidewalk to the entrance of the [facility], follow [facility] and [facility], enter the [facility] to your right BEFORE entering the [address] lobby.  Take the elevators to the 3rd floor ([address] [place] [facility]).  -Leave unnecessary items in your vehicle, including your suitcase.  Your support  person or a family member can get it for you after your procedure.   -Check in at the reception desk in the lobby of the 3rd floor ([address] [place]).   -One person may accompany you to the pre-op/recovery area.  Please have  other family members wait in the  waiting room.   -An anesthesiologist will meet with your prior to your procedure.   -After anesthesia has been initiated, one person may accompany you in the  operating room.   -No video cameras are permitted in the operating room; only still cameras,  Please.      What to expect while you are in recovery:     -One person may stay with you while you are in recovery.   -If the baby is stable, he/she may visit to initiate breastfeeding & [facility].      CHLORHEXIDINE GLUCONATE WIPES AND INSTRUCTIONS GIVEN TO PATIENT

## 2024-11-22 NOTE — PAT
Patient to apply Chlorhexadine wipes  to surgical area (as instructed) the night before procedure and the AM of procedure. Wipes provided.   Patient instructed to drink 20 ounces of Gatorade or Gatorlyte (if diabetic) and it needs to be completed 1 hour (for Main OR patients) or 2 hours (scheduled  section & [facility] patients) before given arrival time for procedure (NO RED Gatorade and NO Gatorade Zero).    Patient verbalized understanding.   Blood bank bracelet applied to patient during Pre Admission Testing visit.  Patient instructed not to remove from arm until after procedure and they are discharged from the hospital.  Explained to patient that they may shower and get the bracelet wet, but not to immerse under water for longer periods (bathing, swimming, hand dishwashing, etc).  Patient verbalized understanding.

## 2024-11-23 ENCOUNTER — PREP FOR SURGERY (OUTPATIENT)
Dept: OTHER | Facility: HOSPITAL | Age: 34
End: 2024-11-23
Payer: COMMERCIAL

## 2024-11-23 NOTE — H&P (VIEW-ONLY)
Kelli Giles  : 1990  MRN: [id number]  CSN: [id number]    History and Physical    Subjective   Kelli Giles is a 34 y.o. year old  with an Estimated Date of Delivery: 24 scheduled for  delivery due to having failed external cephalic version.  Her placental location is posterior.  She is not planning for sterilization at the time of the .    Prenatal care has been with Dr. Benton.  It has been benign.    OB History    Para Term  AB Living   1 0 0 0 0 0   SAB IAB Ectopic Molar Multiple Live Births   0 0 0 0 0 0      # Outcome Date GA Lbr Harjinder/2nd Weight Sex Type Anes PTL Lv   1 Current              Past Medical History:   Diagnosis Date    Kyleena 10/2019    Placed on 10/28/2019 and removed on 23     No past surgical history on file.    Current Outpatient Medications:     Prenatal Vit-Fe Fumarate-FA (prenatal vitamin 27-0.8) 27-0.8 MG tablet tablet, Take 1 tablet by mouth Daily., Disp: , Rfl:     Allergies   Allergen Reactions    Penicillins Swelling    Sulfa Antibiotics Rash       Social History    Tobacco Use      Smoking status: Never        Passive exposure: Never      Smokeless tobacco: Never    Review of Systems   Constitutional:  Negative for unexpected weight change.   Respiratory:  Negative for cough and shortness of breath.    Cardiovascular:  Negative for chest pain.   Gastrointestinal:  Negative for abdominal distention, constipation and diarrhea.        No persistent bloating   Genitourinary:  Negative for difficulty urinating, dysuria, hematuria and urgency.        No significant incontinence   Skin:  Negative for rash.   Neurological:  Negative for headaches.         Objective   LMP 2024   General: well developed; well nourished  no acute distress  mentation appropriate   Heart: Not performed.   Lungs: breathing is unlabored   Abdomen: soft, non-tender; no masses  no umbilical or inguinal hernias are present  no  hepato-splenomegaly     Prenatal Labs  Lab Results   Component Value Date    HGB 13.9 2024    RUBELLAABIGG 1.39 2024    HEPBSAG Non-Reactive 2024    ABSCRN Negative 2024    [id number] Non-Reactive 2024    HEPCVIRUSABY Non-Reactive 2024     2024    STREPGPB negative 2024    URINECX 25,000 CFU/mL Normal Urogenital Makayla 2024    CHLAMNAA Negative 2024    NGONORRHON Negative 2024       Recent Labs  Lab Results   Component Value Date    HGB 13.9 2024    HCT 40.2 2024    WBC 7.70 2024     2024           Assessment   IUP with an Estimated Date of Delivery: 24  Planned  section for persistent breech     Plan   Primary   ABx and DVT prophylaxis    Jacob Benton MD  2024

## 2024-11-23 NOTE — H&P
Kelli Giles  : 1990  MRN: [id number]  CSN: [id number]    History and Physical    Subjective   Kelli Giles is a 34 y.o. year old  with an Estimated Date of Delivery: 24 scheduled for  delivery due to having failed external cephalic version.  Her placental location is posterior.  She is not planning for sterilization at the time of the .    Prenatal care has been with Dr. Benton.  It has been benign.    OB History    Para Term  AB Living   1 0 0 0 0 0   SAB IAB Ectopic Molar Multiple Live Births   0 0 0 0 0 0      # Outcome Date GA Lbr Harjinder/2nd Weight Sex Type Anes PTL Lv   1 Current              Past Medical History:   Diagnosis Date    Kyleena 10/2019    Placed on 10/28/2019 and removed on 23     No past surgical history on file.    Current Outpatient Medications:     Prenatal Vit-Fe Fumarate-FA (prenatal vitamin 27-0.8) 27-0.8 MG tablet tablet, Take 1 tablet by mouth Daily., Disp: , Rfl:     Allergies   Allergen Reactions    Penicillins Swelling    Sulfa Antibiotics Rash       Social History    Tobacco Use      Smoking status: Never        Passive exposure: Never      Smokeless tobacco: Never    Review of Systems   Constitutional:  Negative for unexpected weight change.   Respiratory:  Negative for cough and shortness of breath.    Cardiovascular:  Negative for chest pain.   Gastrointestinal:  Negative for abdominal distention, constipation and diarrhea.        No persistent bloating   Genitourinary:  Negative for difficulty urinating, dysuria, hematuria and urgency.        No significant incontinence   Skin:  Negative for rash.   Neurological:  Negative for headaches.         Objective   LMP 2024   General: well developed; well nourished  no acute distress  mentation appropriate   Heart: Not performed.   Lungs: breathing is unlabored   Abdomen: soft, non-tender; no masses  no umbilical or inguinal hernias are present  no  hepato-splenomegaly     Prenatal Labs  Lab Results   Component Value Date    HGB 13.9 2024    RUBELLAABIGG 1.39 2024    HEPBSAG Non-Reactive 2024    ABSCRN Negative 2024    [id number] Non-Reactive 2024    HEPCVIRUSABY Non-Reactive 2024     2024    STREPGPB negative 2024    URINECX 25,000 CFU/mL Normal Urogenital Makayla 2024    CHLAMNAA Negative 2024    NGONORRHON Negative 2024       Recent Labs  Lab Results   Component Value Date    HGB 13.9 2024    HCT 40.2 2024    WBC 7.70 2024     2024           Assessment   IUP with an Estimated Date of Delivery: 24  Planned  section for persistent breech     Plan   Primary   ABx and DVT prophylaxis    Jacob Benton MD  2024

## 2024-11-25 ENCOUNTER — ANESTHESIA (OUTPATIENT)
Dept: LABOR AND DELIVERY | Facility: HOSPITAL | Age: 34
End: 2024-11-25
Payer: COMMERCIAL

## 2024-11-25 ENCOUNTER — HOSPITAL ENCOUNTER (INPATIENT)
Facility: HOSPITAL | Age: 34
LOS: 3 days | Discharge: HOME | End: 2024-11-28
Attending: OBSTETRICS & GYNECOLOGY | Admitting: OBSTETRICS & GYNECOLOGY
Payer: COMMERCIAL

## 2024-11-25 ENCOUNTER — ANESTHESIA EVENT (OUTPATIENT)
Dept: LABOR AND DELIVERY | Facility: HOSPITAL | Age: 34
End: 2024-11-25
Payer: COMMERCIAL

## 2024-11-25 PROBLEM — Z34.03 ENCOUNTER FOR SUPERVISION OF NORMAL FIRST PREGNANCY IN THIRD TRIMESTER: Status: RESOLVED | Noted: 2024-05-17 | Resolved: 2024-11-25

## 2024-11-25 PROCEDURE — 25810000003 LACTATED RINGERS PER 1000 ML: Performed by: OBSTETRICS & GYNECOLOGY

## 2024-11-25 PROCEDURE — 25010000002 CEFAZOLIN PER 500 MG: Performed by: OBSTETRICS & GYNECOLOGY

## 2024-11-25 PROCEDURE — 59025 FETAL NON-STRESS TEST: CPT

## 2024-11-25 PROCEDURE — 25010000002 DIPHENHYDRAMINE PER 50 MG: Performed by: OBSTETRICS & GYNECOLOGY

## 2024-11-25 PROCEDURE — 25010000002 FENTANYL CITRATE (PF) 100 MCG/2ML SOLUTION: Performed by: ANESTHESIOLOGY

## 2024-11-25 PROCEDURE — 25010000002 BUPIVACAINE IN DEXTROSE 0.75-8.25 % SOLUTION: Performed by: ANESTHESIOLOGY

## 2024-11-25 PROCEDURE — 25010000002 OXYTOCIN PER 10 UNITS: Performed by: ANESTHESIOLOGY

## 2024-11-25 PROCEDURE — 59510 CESAREAN DELIVERY: CPT | Performed by: OBSTETRICS & GYNECOLOGY

## 2024-11-25 PROCEDURE — 25010000002 MORPHINE PER 10 MG: Performed by: ANESTHESIOLOGY

## 2024-11-25 PROCEDURE — 25010000002 KETOROLAC TROMETHAMINE PER 15 MG: Performed by: OBSTETRICS & GYNECOLOGY

## 2024-11-25 PROCEDURE — 25810000003 LACTATED RINGERS SOLUTION: Performed by: OBSTETRICS & GYNECOLOGY

## 2024-11-25 PROCEDURE — 25810000003 LACTATED RINGERS PER 1000 ML: Performed by: ANESTHESIOLOGY

## 2024-11-25 PROCEDURE — 25010000002 ONDANSETRON PER 1 MG: Performed by: ANESTHESIOLOGY

## 2024-11-25 RX ORDER — PROMETHAZINE HYDROCHLORIDE 12.5 MG/1
12.5 SUPPOSITORY RECTAL EVERY 6 HOURS PRN
Status: DISCONTINUED | OUTPATIENT
Start: 2024-11-25 | End: 2024-11-28 | Stop reason: HOSPADM

## 2024-11-25 RX ORDER — PROMETHAZINE HYDROCHLORIDE 12.5 MG/1
12.5 SUPPOSITORY RECTAL EVERY 6 HOURS PRN
Status: DISCONTINUED | OUTPATIENT
Start: 2024-11-25 | End: 2024-11-25 | Stop reason: HOSPADM

## 2024-11-25 RX ORDER — SIMETHICONE 80 MG
80 TABLET,CHEWABLE ORAL 4 TIMES DAILY PRN
Status: DISCONTINUED | OUTPATIENT
Start: 2024-11-25 | End: 2024-11-28 | Stop reason: HOSPADM

## 2024-11-25 RX ORDER — FAMOTIDINE 10 MG/ML
INJECTION, SOLUTION INTRAVENOUS AS NEEDED
Status: DISCONTINUED | OUTPATIENT
Start: 2024-11-25 | End: 2024-11-25 | Stop reason: SURG

## 2024-11-25 RX ORDER — OXYTOCIN/0.9 % SODIUM CHLORIDE 30/500 ML
650 PLASTIC BAG, INJECTION (ML) INTRAVENOUS ONCE
Status: DISCONTINUED | OUTPATIENT
Start: 2024-11-25 | End: 2024-11-25 | Stop reason: HOSPADM

## 2024-11-25 RX ORDER — OXYTOCIN 10 [USP'U]/ML
INJECTION, SOLUTION INTRAMUSCULAR; INTRAVENOUS AS NEEDED
Status: DISCONTINUED | OUTPATIENT
Start: 2024-11-25 | End: 2024-11-25 | Stop reason: SURG

## 2024-11-25 RX ORDER — HYDROCORTISONE 25 MG/G
1 CREAM TOPICAL AS NEEDED
Status: DISCONTINUED | OUTPATIENT
Start: 2024-11-25 | End: 2024-11-28 | Stop reason: HOSPADM

## 2024-11-25 RX ORDER — SODIUM CHLORIDE 0.9 % (FLUSH) 0.9 %
10 SYRINGE (ML) INJECTION AS NEEDED
Status: DISCONTINUED | OUTPATIENT
Start: 2024-11-25 | End: 2024-11-25 | Stop reason: HOSPADM

## 2024-11-25 RX ORDER — MISOPROSTOL 200 UG/1
600 TABLET ORAL ONCE AS NEEDED
Status: DISCONTINUED | OUTPATIENT
Start: 2024-11-25 | End: 2024-11-26

## 2024-11-25 RX ORDER — OXYCODONE HYDROCHLORIDE 10 MG/1
10 TABLET ORAL EVERY 4 HOURS PRN
Status: DISCONTINUED | OUTPATIENT
Start: 2024-11-25 | End: 2024-11-28 | Stop reason: HOSPADM

## 2024-11-25 RX ORDER — DOCUSATE SODIUM 100 MG/1
100 CAPSULE, LIQUID FILLED ORAL 2 TIMES DAILY PRN
Status: DISCONTINUED | OUTPATIENT
Start: 2024-11-25 | End: 2024-11-28 | Stop reason: HOSPADM

## 2024-11-25 RX ORDER — IBUPROFEN 600 MG/1
600 TABLET, FILM COATED ORAL EVERY 6 HOURS
Status: DISCONTINUED | OUTPATIENT
Start: 2024-11-27 | End: 2024-11-28 | Stop reason: HOSPADM

## 2024-11-25 RX ORDER — ACETAMINOPHEN 500 MG
1000 TABLET ORAL ONCE
Status: DISCONTINUED | OUTPATIENT
Start: 2024-11-25 | End: 2024-11-25 | Stop reason: HOSPADM

## 2024-11-25 RX ORDER — ONDANSETRON 2 MG/ML
INJECTION INTRAMUSCULAR; INTRAVENOUS AS NEEDED
Status: DISCONTINUED | OUTPATIENT
Start: 2024-11-25 | End: 2024-11-25 | Stop reason: SURG

## 2024-11-25 RX ORDER — OXYCODONE HYDROCHLORIDE 5 MG/1
5 TABLET ORAL EVERY 4 HOURS PRN
Status: DISCONTINUED | OUTPATIENT
Start: 2024-11-25 | End: 2024-11-28 | Stop reason: HOSPADM

## 2024-11-25 RX ORDER — BUPIVACAINE HYDROCHLORIDE 7.5 MG/ML
INJECTION, SOLUTION INTRASPINAL AS NEEDED
Status: DISCONTINUED | OUTPATIENT
Start: 2024-11-25 | End: 2024-11-25 | Stop reason: SURG

## 2024-11-25 RX ORDER — ONDANSETRON 4 MG/1
4 TABLET, ORALLY DISINTEGRATING ORAL EVERY 6 HOURS PRN
Status: DISCONTINUED | OUTPATIENT
Start: 2024-11-25 | End: 2024-11-28 | Stop reason: HOSPADM

## 2024-11-25 RX ORDER — OXYTOCIN/0.9 % SODIUM CHLORIDE 30/500 ML
85 PLASTIC BAG, INJECTION (ML) INTRAVENOUS ONCE
Status: DISCONTINUED | OUTPATIENT
Start: 2024-11-25 | End: 2024-11-25 | Stop reason: HOSPADM

## 2024-11-25 RX ORDER — MISOPROSTOL 200 UG/1
800 TABLET ORAL AS NEEDED
Status: DISCONTINUED | OUTPATIENT
Start: 2024-11-25 | End: 2024-11-25 | Stop reason: HOSPADM

## 2024-11-25 RX ORDER — SODIUM CHLORIDE 0.9 % (FLUSH) 0.9 %
10 SYRINGE (ML) INJECTION EVERY 12 HOURS SCHEDULED
Status: DISCONTINUED | OUTPATIENT
Start: 2024-11-25 | End: 2024-11-25 | Stop reason: HOSPADM

## 2024-11-25 RX ORDER — CARBOPROST TROMETHAMINE 250 UG/ML
250 INJECTION, SOLUTION INTRAMUSCULAR ONCE AS NEEDED
Status: DISCONTINUED | OUTPATIENT
Start: 2024-11-25 | End: 2024-11-26

## 2024-11-25 RX ORDER — PROMETHAZINE HYDROCHLORIDE 25 MG/1
25 TABLET ORAL EVERY 6 HOURS PRN
Status: DISCONTINUED | OUTPATIENT
Start: 2024-11-25 | End: 2024-11-28 | Stop reason: HOSPADM

## 2024-11-25 RX ORDER — CALCIUM CARBONATE 500 MG/1
1 TABLET, CHEWABLE ORAL EVERY 4 HOURS PRN
Status: DISCONTINUED | OUTPATIENT
Start: 2024-11-25 | End: 2024-11-28 | Stop reason: HOSPADM

## 2024-11-25 RX ORDER — DIPHENHYDRAMINE HYDROCHLORIDE 50 MG/ML
50 INJECTION INTRAMUSCULAR; INTRAVENOUS EVERY 6 HOURS PRN
Status: DISCONTINUED | OUTPATIENT
Start: 2024-11-25 | End: 2024-11-25

## 2024-11-25 RX ORDER — ACETAMINOPHEN 325 MG/1
650 TABLET ORAL EVERY 6 HOURS
Status: DISCONTINUED | OUTPATIENT
Start: 2024-11-26 | End: 2024-11-28 | Stop reason: HOSPADM

## 2024-11-25 RX ORDER — ALUMINA, MAGNESIA, AND SIMETHICONE 2400; 2400; 240 MG/30ML; MG/30ML; MG/30ML
15 SUSPENSION ORAL EVERY 4 HOURS PRN
Status: DISCONTINUED | OUTPATIENT
Start: 2024-11-25 | End: 2024-11-28 | Stop reason: HOSPADM

## 2024-11-25 RX ORDER — METHYLERGONOVINE MALEATE 0.2 MG/ML
200 INJECTION INTRAVENOUS ONCE AS NEEDED
Status: DISCONTINUED | OUTPATIENT
Start: 2024-11-25 | End: 2024-11-26

## 2024-11-25 RX ORDER — SODIUM CHLORIDE, SODIUM LACTATE, POTASSIUM CHLORIDE, CALCIUM CHLORIDE 600; 310; 30; 20 MG/100ML; MG/100ML; MG/100ML; MG/100ML
125 INJECTION, SOLUTION INTRAVENOUS CONTINUOUS
Status: DISCONTINUED | OUTPATIENT
Start: 2024-11-25 | End: 2024-11-25

## 2024-11-25 RX ORDER — OXYTOCIN/0.9 % SODIUM CHLORIDE 30/500 ML
125 PLASTIC BAG, INJECTION (ML) INTRAVENOUS ONCE AS NEEDED
Status: DISCONTINUED | OUTPATIENT
Start: 2024-11-25 | End: 2024-11-26

## 2024-11-25 RX ORDER — SODIUM CHLORIDE, SODIUM LACTATE, POTASSIUM CHLORIDE, CALCIUM CHLORIDE 600; 310; 30; 20 MG/100ML; MG/100ML; MG/100ML; MG/100ML
125 INJECTION, SOLUTION INTRAVENOUS CONTINUOUS
Status: DISCONTINUED | OUTPATIENT
Start: 2024-11-25 | End: 2024-11-26

## 2024-11-25 RX ORDER — ONDANSETRON 2 MG/ML
4 INJECTION INTRAMUSCULAR; INTRAVENOUS EVERY 6 HOURS PRN
Status: DISCONTINUED | OUTPATIENT
Start: 2024-11-25 | End: 2024-11-28 | Stop reason: HOSPADM

## 2024-11-25 RX ORDER — ACETAMINOPHEN 500 MG
1000 TABLET ORAL EVERY 6 HOURS
Status: COMPLETED | OUTPATIENT
Start: 2024-11-25 | End: 2024-11-26

## 2024-11-25 RX ORDER — KETOROLAC TROMETHAMINE 15 MG/ML
15 INJECTION, SOLUTION INTRAMUSCULAR; INTRAVENOUS EVERY 6 HOURS
Status: COMPLETED | OUTPATIENT
Start: 2024-11-26 | End: 2024-11-26

## 2024-11-25 RX ORDER — FENTANYL CITRATE 50 UG/ML
INJECTION, SOLUTION INTRAMUSCULAR; INTRAVENOUS AS NEEDED
Status: DISCONTINUED | OUTPATIENT
Start: 2024-11-25 | End: 2024-11-25 | Stop reason: SURG

## 2024-11-25 RX ORDER — CARBOPROST TROMETHAMINE 250 UG/ML
250 INJECTION, SOLUTION INTRAMUSCULAR AS NEEDED
Status: DISCONTINUED | OUTPATIENT
Start: 2024-11-25 | End: 2024-11-25 | Stop reason: HOSPADM

## 2024-11-25 RX ORDER — SODIUM CHLORIDE, SODIUM LACTATE, POTASSIUM CHLORIDE, CALCIUM CHLORIDE 600; 310; 30; 20 MG/100ML; MG/100ML; MG/100ML; MG/100ML
INJECTION, SOLUTION INTRAVENOUS CONTINUOUS PRN
Status: DISCONTINUED | OUTPATIENT
Start: 2024-11-25 | End: 2024-11-25 | Stop reason: SURG

## 2024-11-25 RX ORDER — CITRIC ACID/SODIUM CITRATE 334-500MG
30 SOLUTION, ORAL ORAL ONCE
Status: COMPLETED | OUTPATIENT
Start: 2024-11-25 | End: 2024-11-25

## 2024-11-25 RX ORDER — LIDOCAINE HYDROCHLORIDE 10 MG/ML
0.5 INJECTION, SOLUTION EPIDURAL; INFILTRATION; INTRACAUDAL; PERINEURAL ONCE AS NEEDED
Status: DISCONTINUED | OUTPATIENT
Start: 2024-11-25 | End: 2024-11-25 | Stop reason: HOSPADM

## 2024-11-25 RX ORDER — SODIUM CHLORIDE 9 MG/ML
40 INJECTION, SOLUTION INTRAVENOUS AS NEEDED
Status: DISCONTINUED | OUTPATIENT
Start: 2024-11-25 | End: 2024-11-25 | Stop reason: HOSPADM

## 2024-11-25 RX ORDER — MORPHINE SULFATE 0.5 MG/ML
INJECTION, SOLUTION EPIDURAL; INTRATHECAL; INTRAVENOUS AS NEEDED
Status: DISCONTINUED | OUTPATIENT
Start: 2024-11-25 | End: 2024-11-25 | Stop reason: SURG

## 2024-11-25 RX ORDER — FENTANYL CITRATE 50 UG/ML
50 INJECTION, SOLUTION INTRAMUSCULAR; INTRAVENOUS
Status: DISCONTINUED | OUTPATIENT
Start: 2024-11-25 | End: 2024-11-25 | Stop reason: HOSPADM

## 2024-11-25 RX ORDER — PROMETHAZINE HYDROCHLORIDE 12.5 MG/1
12.5 TABLET ORAL EVERY 6 HOURS PRN
Status: DISCONTINUED | OUTPATIENT
Start: 2024-11-25 | End: 2024-11-25 | Stop reason: HOSPADM

## 2024-11-25 RX ORDER — OXYTOCIN/0.9 % SODIUM CHLORIDE 30/500 ML
PLASTIC BAG, INJECTION (ML) INTRAVENOUS AS NEEDED
Status: DISCONTINUED | OUTPATIENT
Start: 2024-11-25 | End: 2024-11-25 | Stop reason: SURG

## 2024-11-25 RX ORDER — KETOROLAC TROMETHAMINE 30 MG/ML
30 INJECTION, SOLUTION INTRAMUSCULAR; INTRAVENOUS ONCE
Status: COMPLETED | OUTPATIENT
Start: 2024-11-25 | End: 2024-11-25

## 2024-11-25 RX ORDER — METHYLERGONOVINE MALEATE 0.2 MG/ML
200 INJECTION INTRAVENOUS ONCE AS NEEDED
Status: DISCONTINUED | OUTPATIENT
Start: 2024-11-25 | End: 2024-11-25 | Stop reason: HOSPADM

## 2024-11-25 RX ADMIN — MORPHINE SULFATE 0.15 MG: 0.5 INJECTION, SOLUTION EPIDURAL; INTRATHECAL; INTRAVENOUS at 17:43

## 2024-11-25 RX ADMIN — KETOROLAC TROMETHAMINE 30 MG: 30 INJECTION, SOLUTION INTRAMUSCULAR; INTRAVENOUS at 19:40

## 2024-11-25 RX ADMIN — FENTANYL CITRATE 20 MCG: 50 INJECTION, SOLUTION INTRAMUSCULAR; INTRAVENOUS at 17:43

## 2024-11-25 RX ADMIN — Medication 500 ML: at 18:07

## 2024-11-25 RX ADMIN — SODIUM CHLORIDE 2 G: 900 INJECTION INTRAVENOUS at 17:10

## 2024-11-25 RX ADMIN — SODIUM CHLORIDE, POTASSIUM CHLORIDE, SODIUM LACTATE AND CALCIUM CHLORIDE 125 ML/HR: 600; 310; 30; 20 INJECTION, SOLUTION INTRAVENOUS at 20:51

## 2024-11-25 RX ADMIN — DIPHENHYDRAMINE HYDROCHLORIDE 50 MG: 50 INJECTION INTRAMUSCULAR; INTRAVENOUS at 17:25

## 2024-11-25 RX ADMIN — ACETAMINOPHEN 1000 MG: 500 TABLET ORAL at 23:32

## 2024-11-25 RX ADMIN — SODIUM CHLORIDE, POTASSIUM CHLORIDE, SODIUM LACTATE AND CALCIUM CHLORIDE 125 ML/HR: 600; 310; 30; 20 INJECTION, SOLUTION INTRAVENOUS at 16:00

## 2024-11-25 RX ADMIN — ONDANSETRON 4 MG: 2 INJECTION INTRAMUSCULAR; INTRAVENOUS at 17:36

## 2024-11-25 RX ADMIN — FAMOTIDINE 20 MG: 10 INJECTION, SOLUTION INTRAVENOUS at 17:36

## 2024-11-25 RX ADMIN — OXYTOCIN 10 UNITS: 10 INJECTION INTRAVENOUS at 18:05

## 2024-11-25 RX ADMIN — SODIUM CHLORIDE, POTASSIUM CHLORIDE, SODIUM LACTATE AND CALCIUM CHLORIDE 1000 ML: 600; 310; 30; 20 INJECTION, SOLUTION INTRAVENOUS at 17:24

## 2024-11-25 RX ADMIN — BUPIVACAINE HYDROCHLORIDE IN DEXTROSE 1.4 ML: 7.5 INJECTION, SOLUTION SUBARACHNOID at 17:43

## 2024-11-25 RX ADMIN — SODIUM CHLORIDE, POTASSIUM CHLORIDE, SODIUM LACTATE AND CALCIUM CHLORIDE: 600; 310; 30; 20 INJECTION, SOLUTION INTRAVENOUS at 17:34

## 2024-11-25 RX ADMIN — SODIUM CITRATE AND CITRIC ACID MONOHYDRATE 30 ML: 500; 334 SOLUTION ORAL at 17:26

## 2024-11-25 NOTE — ANESTHESIA PREPROCEDURE EVALUATION
Anesthesia Evaluation     Patient summary reviewed and Nursing notes reviewed   NPO Solid Status: > 8 hours  NPO Liquid Status: > 2 hours           Airway   Mallampati: I  TM distance: >3 FB  No difficulty expected  Dental      Pulmonary - negative pulmonary ROS    breath sounds clear to auscultation  Cardiovascular - negative cardio ROS    Rhythm: regular  Rate: normal        Neuro/Psych- negative ROS  GI/Hepatic/Renal/Endo - negative ROS     Musculoskeletal (-) negative ROS    Abdominal    Substance History - negative use     OB/GYN    (+) Pregnant        Other                    Anesthesia Plan    ASA 2     ITN and spinal       Anesthetic plan, risks, benefits, and alternatives have been provided, discussed and informed consent has been obtained with: patient.    Use of blood products discussed with patient .      CODE STATUS:    Level Of Support Discussed With: Patient  Code Status (Patient has no pulse and is not breathing): CPR (Attempt to Resuscitate)  Medical Interventions (Patient has pulse or is breathing): Full Support

## 2024-11-25 NOTE — ANESTHESIA POSTPROCEDURE EVALUATION
Patient: Kelli Giles    Procedure Summary       Date: 24 Room / Location: [facility] LABOR DELIVERY   [facility] LABOR DELIVERY    Anesthesia Start:  Anesthesia Stop:     Procedure:  SECTION PRIMARY (Abdomen) Diagnosis:     Surgeons: Jacob Benton MD Provider: Manolo Santos DO    Anesthesia Type: ITN, spinal ASA Status: 2            Anesthesia Type: ITN, spinal    Vitals  Vitals Value Taken Time   BP 93/55 24   Temp 97.9 °F (36.6 °C) 24   Pulse 73 24   Resp 18 24   SpO2 100 % 24   Vitals shown include unfiled device data.        Post Anesthesia Care and Evaluation    Patient location during evaluation: bedside  Patient participation: complete - patient participated  Level of consciousness: awake  Pain score: 0  Pain management: satisfactory to patient    Airway patency: patent  Anesthetic complications: No anesthetic complications  PONV Status: none  Cardiovascular status: acceptable and hemodynamically stable  Respiratory status: acceptable  Hydration status: acceptable

## 2024-11-25 NOTE — ANESTHESIA PROCEDURE NOTES
Spinal Block      Patient reassessed immediately prior to procedure    Patient location during procedure: OB  Performed By  Anesthesiologist: Manolo Santso DO  Preanesthetic Checklist  Completed: patient identified, IV checked, site marked, risks and benefits discussed, surgical consent, monitors and equipment checked, pre-op evaluation and timeout performed  Spinal Block Prep:  Patient Position:sitting  Sterile Tech:cap, gloves, mask and sterile barriers  Prep:Chloraprep  Patient Monitoring:blood pressure monitoring, continuous pulse oximetry and EKG    Spinal Block Procedure  Approach:midline  Guidance:landmark technique and palpation technique  Location:L3-L4  Needle Type:Nola  Needle Gauge:25 G  Placement of Spinal needle event:cerebrospinal fluid aspirated  Paresthesia: no  Fluid Appearance:clear     Post Assessment  Patient Tolerance:patient tolerated the procedure well with no apparent complications  Complications no

## 2024-11-26 LAB
HCT VFR BLD AUTO: 35.2 % (ref 34–46.6)
HGB BLD-MCNC: 11.6 G/DL (ref 12–15.9)

## 2024-11-26 PROCEDURE — 85018 HEMOGLOBIN: CPT | Performed by: OBSTETRICS & GYNECOLOGY

## 2024-11-26 PROCEDURE — 25010000002 KETOROLAC TROMETHAMINE PER 15 MG: Performed by: OBSTETRICS & GYNECOLOGY

## 2024-11-26 PROCEDURE — 0503F POSTPARTUM CARE VISIT: CPT | Performed by: OBSTETRICS & GYNECOLOGY

## 2024-11-26 PROCEDURE — 85014 HEMATOCRIT: CPT | Performed by: OBSTETRICS & GYNECOLOGY

## 2024-11-26 RX ADMIN — ACETAMINOPHEN 1000 MG: 500 TABLET ORAL at 05:02

## 2024-11-26 RX ADMIN — ACETAMINOPHEN 1000 MG: 500 TABLET ORAL at 17:29

## 2024-11-26 RX ADMIN — SIMETHICONE 80 MG: 80 TABLET, CHEWABLE ORAL at 20:31

## 2024-11-26 RX ADMIN — KETOROLAC TROMETHAMINE 15 MG: 15 INJECTION, SOLUTION INTRAMUSCULAR; INTRAVENOUS at 02:36

## 2024-11-26 RX ADMIN — KETOROLAC TROMETHAMINE 15 MG: 15 INJECTION, SOLUTION INTRAMUSCULAR; INTRAVENOUS at 14:30

## 2024-11-26 RX ADMIN — DOCUSATE SODIUM 100 MG: 100 CAPSULE, LIQUID FILLED ORAL at 20:31

## 2024-11-26 RX ADMIN — ACETAMINOPHEN 1000 MG: 500 TABLET ORAL at 11:22

## 2024-11-26 RX ADMIN — Medication 1 APPLICATION: at 20:32

## 2024-11-26 RX ADMIN — KETOROLAC TROMETHAMINE 15 MG: 15 INJECTION, SOLUTION INTRAMUSCULAR; INTRAVENOUS at 20:32

## 2024-11-26 RX ADMIN — DOCUSATE SODIUM 100 MG: 100 CAPSULE, LIQUID FILLED ORAL at 09:08

## 2024-11-26 RX ADMIN — SIMETHICONE 80 MG: 80 TABLET, CHEWABLE ORAL at 09:08

## 2024-11-26 RX ADMIN — KETOROLAC TROMETHAMINE 15 MG: 15 INJECTION, SOLUTION INTRAMUSCULAR; INTRAVENOUS at 09:08

## 2024-11-26 RX ADMIN — ACETAMINOPHEN 650 MG: 325 TABLET ORAL at 23:03

## 2024-11-26 NOTE — LACTATION NOTE
11/26/24 1358   Maternal Information   Date of Referral 11/26/24   Person Making Referral patient  (Pt states she needs help w/feeding. Infant asleep in crib.)   Maternal Infant Feeding   Maternal Emotional State receptive  (Pt encouraged to have infant skin to skin when trying to nurse.)   Infant Positioning clutch/football  (Right)   Signs of Milk Transfer deep jaw excursions noted   Pain with Feeding no   Comfort Measures Before/During Feeding suction broken using finger;maternal position adjusted;latch adjusted;infant position adjusted   Latch Assistance full assistance needed  (Pt took over feeding after demonstration given)   Support Person Involvement actively supporting mother

## 2024-11-26 NOTE — ANESTHESIA POSTPROCEDURE EVALUATION
Patient: Kelli Giles    Procedure Summary       Date: 24 Room / Location: [facility] LABOR DELIVERY   [facility] LABOR DELIVERY    Anesthesia Start:  Anesthesia Stop:     Procedure:  SECTION PRIMARY (Abdomen) Diagnosis:     Surgeons: Jacob Benton MD Provider: Manolo Santos DO    Anesthesia Type: ITN, spinal ASA Status: 2            Anesthesia Type: ITN, spinal    Vitals  Vitals Value Taken Time   /70 24 1210   Temp 98 °F (36.7 °C) 24 1210   Pulse 63 24 1210   Resp 16 24 1210   SpO2 98 % 24   Vitals shown include unfiled device data.        Post Anesthesia Care and Evaluation    Patient location during evaluation: bedside  Patient participation: complete - patient participated  Level of consciousness: awake and alert  Pain management: adequate    Airway patency: patent  Anesthetic complications: No anesthetic complications    Cardiovascular status: acceptable  Respiratory status: acceptable  Hydration status: acceptable  Post Neuraxial Block status: Motor and sensory function returned to baseline and No signs or symptoms of PDPH

## 2024-11-26 NOTE — LACTATION NOTE
11/26/24 1125   Maternal Information   Date of Referral 11/26/24   Person Making Referral lactation consultant   Maternal Reason for Referral no prior breastfeeding experience  (Courtesy visit for new delivery.)   Maternal Assessment   Breast Size Issue none   Breast Shape Bilateral:;round   Breast Density Bilateral:;soft   Nipples Bilateral:;graspable   Left Nipple Symptoms intact;nontender   Right Nipple Symptoms intact;nontender   Maternal Infant Feeding   Maternal Emotional State receptive;relaxed   Infant Positioning   (multiple attempts in RFB & LCC however infant too sleepy, reluctant to nurse. Encouraged pt to do a lot of skin to skin. Pt educ on feeding cues & encouraged to latch infant when she sees these cues.)   Signs of Milk Transfer none noted   Latch Assistance full assistance needed   Support Person Involvement invited to assist with feeding  (patient's mother @ bedside)   Milk Expression/Equipment   Breast Pump Type double electric, personal  (pt has a Spectra pump for home use. Encouraged pt to pump for short or missed feedings or if formula is given)

## 2024-11-26 NOTE — PROGRESS NOTES
MARILOU Alvares  Kelli Giles  : 1990  MRN: [id number]  CSN: [id number]    Hospital Day: 2    Post-operative Day #1  Subjective       CC: hospital follow-up    Her pain is well controlled. Vaginal bleeding is normal in amount. She is ambulating without difficulty. Her baby is doing well.     Objective     Min/max vitals past 24 hours:   Temp  Min: 97.6 °F (36.4 °C)  Max: 98 °F (36.7 °C)  BP  Min: 93/55  Max: 121/83  Pulse  Min: 58  Max: 88  Resp  Min: 16  Max: 18        General: well developed; well nourished  no acute distress   Abdomen: soft, non-tender; no masses  no umbilical or inguinal hernias are present  no hepato-splenomegaly  incision is clean, dry, intact, and without drainage   Pelvic: Not performed   Ext: Calves NT     Last 3 values   Results from last 7 days   Lab Units 24  0527 24  1044   WBC 10*3/mm3  --  7.70   HEMOGLOBIN g/dL 11.6* 13.9   HEMATOCRIT % 35.2 40.2   PLATELETS 10*3/mm3  --  183     Lab Results   Component Value Date    RH Positive 2024    HEPBSAG Non-Reactive 2024     Results from last 7 days   Lab Units 24  1045   TREPONEMA PALLIDUM AB TOTAL  Non-Reactive            Assessment   POD #1 S/P Primary  (LTCS)  Doing well     Plan   Continue routine post-operative care    Jacob Benton MD  2024  07:48 EST

## 2024-11-26 NOTE — OP NOTE
Giorgio  Kelli Giles  : 1990  MRN: [id number]  CSN: [id number]     Section Operative Note    Pre-Operative Dx:   Intrauterine pregnancy at 39w5d weeks   Persistent breech      Postoperative dx:    Same   Intrauterine pregnancy at 39w5d weeks - delivered           Procedure: Primary  (LTCS)  - 2 layer closure       Surgeon: Jacob Benton MD   Assistant: Lanette (scrub tech)  was responsible for performing the following activities: Retraction and Irrigation and their skilled assistance was necessary for the success of this case.       Anesthesia: Spinal       EBL: 350 mls.   IV Fluids: 1400 mls.   UOP: 300 mls.     Antibiotics: cefazolin 2 gms     Infant      Name:  Milka      Gender: female infant    Weight: 3370 g (7 lb 6.9 oz)    Apgars: 5  @ 1 minute / 9  @ 5 minutes     Indications:  Kelli Giles is a  @ 39w5d presenting for primary  due to persistent breech, failing external cephalic version previous    Procedure Details:   After the patient was adequately anesthetized, she was sterilely prepped and draped in the dorsal supine left lateral tilt position. A Pfannenstiel incision was created sharply with the knife. It was carried down to the fascia with the Bovie.  The fascia was cut transversely with the knife and extended in a curvilinear fashion with scissors. The fascia was freed from its midline insertion superiorly and inferiorly. Rectus muscles were  in the midline. The peritoneum was sharply entered and a bladder flap was not sharply created. The lower uterine segment was scored transversely with the knife. Clear amniotic fluid was seen. The infant was in alia breech presentation.  The breech was delivered through the incision and the infant was grasped at the anterior superior iliac spines and delivered until the knees could be flexed and the legs were delivered.  Traction was used until scapula became evident and then the arms were  swept anteriorly across the chest.  The head was delivered in flexion.  The cord was clamped and the infant was handed to the delivery team which was in attendance. The placenta was spontaneously extracted. The uterus was explored with no evidence of a septum seen.  It was exteriorized and wiped free of debris and clot. The uterine incision was closed with #1 chromic in a continuous running locking fashion. A second #1 chromic was used to imbricate across the first.  The bladder flap was reapproximated.    The uterus was returned to the abdomen. The paracolic gutters were cleared of debris and clot. Parietal peritoneum and posterior rectus sheath were closed with 3-0 Vicryl. Rectus muscles were approximated with #1 chromic. The fascia was closed with 0 PDS sewing from either corner, tying in the midline and burying all knots. The subcutaneous tissue was copiously irrigated. Analisa's fascia was closed with 3-0 Vicryl and the skin was closed with the Insorb stapler and skin glue was applied.  All counts were correct.         Complications:   None      Disposition:   Mother to Mother Baby/Postpartum  in stable condition currently.   Baby to NBN  in stable condition currently.       Jacob Benton MD  11/25/2024  19:12 EST

## 2024-11-27 PROCEDURE — 0503F POSTPARTUM CARE VISIT: CPT | Performed by: OBSTETRICS & GYNECOLOGY

## 2024-11-27 RX ADMIN — ACETAMINOPHEN 650 MG: 325 TABLET ORAL at 16:51

## 2024-11-27 RX ADMIN — DOCUSATE SODIUM 100 MG: 100 CAPSULE, LIQUID FILLED ORAL at 20:02

## 2024-11-27 RX ADMIN — SIMETHICONE 80 MG: 80 TABLET, CHEWABLE ORAL at 20:02

## 2024-11-27 RX ADMIN — ACETAMINOPHEN 650 MG: 325 TABLET ORAL at 10:14

## 2024-11-27 RX ADMIN — ACETAMINOPHEN 650 MG: 325 TABLET ORAL at 22:24

## 2024-11-27 RX ADMIN — DOCUSATE SODIUM 100 MG: 100 CAPSULE, LIQUID FILLED ORAL at 08:15

## 2024-11-27 RX ADMIN — IBUPROFEN 600 MG: 600 TABLET ORAL at 01:23

## 2024-11-27 RX ADMIN — IBUPROFEN 600 MG: 600 TABLET ORAL at 13:45

## 2024-11-27 RX ADMIN — ACETAMINOPHEN 650 MG: 325 TABLET ORAL at 05:24

## 2024-11-27 RX ADMIN — IBUPROFEN 600 MG: 600 TABLET ORAL at 20:02

## 2024-11-27 RX ADMIN — IBUPROFEN 600 MG: 600 TABLET ORAL at 08:15

## 2024-11-27 RX ADMIN — SIMETHICONE 80 MG: 80 TABLET, CHEWABLE ORAL at 08:15

## 2024-11-27 NOTE — LACTATION NOTE
24 1500   Maternal Information   Date of Referral 24   Person Making Referral patient   Maternal Reason for Referral other (see comments)  (Mom would like a latch evaluation)   Infant Reason for Referral  infant   Maternal Assessment   Breast Size Issue none   Breast Shape Bilateral:;round   Breast Density Bilateral:;dense   Nipples Bilateral:;everted   Left Nipple Symptoms intact;nontender   Maternal Infant Feeding   Maternal Emotional State receptive;relaxed   Infant Positioning clutch/football;cross-cradle  (Football on right and cross cradle on left.)   Signs of Milk Transfer deep jaw excursions noted;other (see comments)  (Baby latches for short periods and comes off.  She is very active.  Mom's milk hand expresses very well.)   Pain with Feeding no   Comfort Measures Before/During Feeding infant position adjusted;maternal position adjusted   Latch Assistance minimal assistance   Support Person Involvement actively supporting mother     Baby is very active at the breast and latches/unlatches/re-latches often.  Mom's milk seems to be expressing very well and could be too much for baby for more than a few sucks.  Mom was encouraged to continue to help baby re-latch often to keep the feeding session going for longer periods.  She was also encouraged to call for assistance as needed. It was observed with finger suck evaluation on gloved finger that baby's palate is high. However, her suck feels normal.

## 2024-11-27 NOTE — PROGRESS NOTES
MARILOU Alvares  Kelli Giles  : 1990  MRN: [id number]  CSN: [id number]    Hospital Day: 3    Post-operative Day #2  Subjective     CC: hospital follow-up    Her pain is well controlled. Vaginal bleeding is normal in amount. She is ambulating without difficulty. She is passing gas. She is voiding without difficulty. Her baby is doing well.  She would like to stay until tomorrow.     Objective     Min/max vitals past 24 hours:   Temp  Min: 97.5 °F (36.4 °C)  Max: 98 °F (36.7 °C)  BP  Min: 110/73  Max: 125/63  Pulse  Min: 60  Max: 73  Resp  Min: 14  Max: 16        General: well developed; well nourished  no acute distress   Abdomen: soft, non-tender; no masses  no umbilical or inguinal hernias are present  no hepato-splenomegaly  incision is healing, clean, dry, intact, and without drainage   Pelvic: Not performed   Ext: Calves NT     Results from last 7 days   Lab Units 24  0527 24  1044   WBC 10*3/mm3  --  7.70   HEMOGLOBIN g/dL 11.6* 13.9   HEMATOCRIT % 35.2 40.2   PLATELETS 10*3/mm3  --  183     Lab Results   Component Value Date    RH Positive 2024    HEPBSAG Non-Reactive 2024     Results from last 7 days   Lab Units 24  1045   TREPONEMA PALLIDUM AB TOTAL  Non-Reactive            Assessment   POD #2 S/P Primary  (LTCS)  Doing well     Plan   Continue routine post-operative care    Jacob Benton MD  2024  10:55 EST

## 2024-11-27 NOTE — PLAN OF CARE
Goal Outcome Evaluation:  Plan of Care Reviewed With: patient   VSS, fundus/lochia/incision WDL, pain well controlled with scheduled ERAS meds, voiding without difficulty

## 2024-11-27 NOTE — LACTATION NOTE
Mom said baby is nursing okay, but continues to nurse for short times before falling asleep.  She said baby has been very gassy.  Wake-up techniques were discussed.  Mom was encouraged to call for latch assistance at the next feeding, and she said she would.

## 2024-11-28 VITALS
TEMPERATURE: 97.5 F | HEART RATE: 62 BPM | DIASTOLIC BLOOD PRESSURE: 72 MMHG | WEIGHT: 146 LBS | SYSTOLIC BLOOD PRESSURE: 111 MMHG | RESPIRATION RATE: 16 BRPM | HEIGHT: 63 IN | OXYGEN SATURATION: 98 % | BODY MASS INDEX: 25.87 KG/M2

## 2024-11-28 PROCEDURE — 0503F POSTPARTUM CARE VISIT: CPT | Performed by: OBSTETRICS & GYNECOLOGY

## 2024-11-28 RX ORDER — IBUPROFEN 600 MG/1
600 TABLET, FILM COATED ORAL EVERY 6 HOURS
Qty: 30 TABLET | Refills: 0 | Status: SHIPPED | OUTPATIENT
Start: 2024-11-28 | End: 2024-11-28

## 2024-11-28 RX ORDER — IBUPROFEN 600 MG/1
600 TABLET, FILM COATED ORAL EVERY 6 HOURS
Qty: 30 TABLET | Refills: 0 | Status: SHIPPED | OUTPATIENT
Start: 2024-11-28

## 2024-11-28 RX ORDER — HYDROCODONE BITARTRATE AND ACETAMINOPHEN 5; 325 MG/1; MG/1
1 TABLET ORAL EVERY 6 HOURS PRN
Qty: 14 TABLET | Refills: 0 | Status: SHIPPED | OUTPATIENT
Start: 2024-11-28 | End: 2024-11-28

## 2024-11-28 RX ORDER — HYDROCODONE BITARTRATE AND ACETAMINOPHEN 5; 325 MG/1; MG/1
1 TABLET ORAL EVERY 6 HOURS PRN
Qty: 14 TABLET | Refills: 0 | Status: SHIPPED | OUTPATIENT
Start: 2024-11-28 | End: 2024-12-03

## 2024-11-28 RX ADMIN — DOCUSATE SODIUM 100 MG: 100 CAPSULE, LIQUID FILLED ORAL at 08:00

## 2024-11-28 RX ADMIN — SIMETHICONE 80 MG: 80 TABLET, CHEWABLE ORAL at 08:00

## 2024-11-28 RX ADMIN — ACETAMINOPHEN 650 MG: 325 TABLET ORAL at 04:37

## 2024-11-28 RX ADMIN — IBUPROFEN 600 MG: 600 TABLET ORAL at 02:06

## 2024-11-28 RX ADMIN — IBUPROFEN 600 MG: 600 TABLET ORAL at 08:00

## 2024-11-28 RX ADMIN — ACETAMINOPHEN 650 MG: 325 TABLET ORAL at 10:01

## 2024-11-28 NOTE — DISCHARGE SUMMARY
Discharge Summary     Giorgio Giles  : 1990  MRN: [id number]  [facility]: [id number]    Date of Admission: 2024   Date of Discharge:  2024   Delivering Physician: Jacob Benton MD       Admission Diagnosis: Pregnancy at 39w5d  Malpresentation: breech     Discharge Diagnosis: Same as above plus  Pregnancy at 39w5d - delivered   Procedures: Primary  (LTCS)  - 2 layer closure     Hospital Course  See the completed operative report for details regarding her delivery.    Her post-operative course was unremarkable.  On POD # 3 she felt like she was ready for D/C.  She was evaluated by Dr. Benton who agreed she was able to be discharged to home.  She had no febrile morbidity. She had normal bowel and bladder function and was hemodynamically stable.  Her wound was healing well without obvious signs of infections.    Infant  female fetus weighing 3370 g (7 lb 6.9 oz)  Apgars -  5 @ 1 minute /  9 @ 5 minutes.    Discharge labs  Lab Results   Component Value Date    WBC 7.70 2024    HGB 11.6 (L) 2024    HCT 35.2 2024     2024       Discharge Medications     Discharge Medications        New Medications        Instructions Start Date   HYDROcodone-acetaminophen 5-325 MG per tablet  Commonly known as: NORCO   1 tablet, Oral, Every 6 Hours PRN      ibuprofen 600 MG tablet  Commonly known as: ADVIL,MOTRIN   600 mg, Oral, Every 6 Hours             Continue These Medications        Instructions Start Date   prenatal vitamin 27-0.8 27-0.8 MG tablet tablet   1 tablet, Daily               Discharge Disposition Home or Self Care   Condition on Discharge: good   Follow-up: 2 weeks with Dr. Chetan Benton MD  2024

## 2024-11-28 NOTE — LACTATION NOTE
24 1000   Maternal Information   Date of Referral 24   Person Making Referral patient   Maternal Reason for Referral other (see comments)  (latch eval prior to discharge)   Infant Reason for Referral  infant  (infant weight loss of 9%)   Maternal Assessment   Breast Size Issue none   Breast Shape Bilateral:;round   Breast Density Bilateral:;dense;engorged   Nipples Bilateral:;everted   Left Nipple Symptoms intact;nontender;leaking (lactating)   Right Nipple Symptoms intact;nontender;leaking (lactating)  (patient is leaking, provided breast pads.)   Maternal Infant Feeding   Maternal Emotional State receptive;relaxed   Infant Positioning clutch/football  (football on both right and left)   Signs of Milk Transfer deep jaw excursions noted;audible swallow  (assisted patient in recognizing a shallow latch, and to break the seal and try again.  Encouraged patient to get more breast tissue in infants mouth for a deeper latch.  After readjusting, infant had a deep latch.)   Pain with Feeding no   Comfort Measures Before/During Feeding infant position adjusted;suction broken using finger;latch adjusted   Nipple Shape After Feeding, Left Breast round;symmetrical   Nipple Shape After Feeding, Right round;symmetrical   Latch Assistance minimal assistance   Support Person Involvement verbally supports mother   Milk Expression/Equipment   Breast Pump Type double electric, personal  (encouraged to pump for short or missed feeds due to infant weight loss.)   Breast Pump Flange Type hard   Breast Pumping   Breast Pumping Interventions post-feed pumping encouraged;other (see comments)  (to pump with any missed feed, or feeding less than 15 minutes.  Patient milk is in, follow up with pediatrician tomorrow.  Provided syringes to use with collected breast milk.)   Lactation Referrals   Lactation Referrals outpatient lactation program  (as needed)     Courtesy visit at the requested patient, and due to infant  weight loss down 9.02%, infant weight loss yesterday was down 5.5%.  Reeducated patient on waking infant every 3 hours to feed, trying for 10 minutes, stimulating infant and trying to wake infant to feed.  Encourage patient to feed more often if infant is showing feeding cues, discussed feeding cues to look for.    Patient was able to latch infant independently, slightly adjusted latch and showed patient what to look for in a good latch versus a shallow latch.  Patient was encouraged by the visit with lactation.  All questions and concerns answered at this time.

## 2024-11-28 NOTE — PROGRESS NOTES
MARILOU Alvares  Kelli Giles  : 1990  MRN: [id number]  CSN: [id number]    Hospital Day: 4    Post-operative Day #3  Subjective     CC: hospital follow-up    Her pain is well controlled. Vaginal bleeding is normal in amount. She is ambulating without difficulty. She is passing gas. She is voiding without difficulty. Her baby is doing well. She wants to go home today.     Objective     Min/max vitals past 24 hours:   Temp  Min: 97.8 °F (36.6 °C)  Max: 98.5 °F (36.9 °C)  BP  Min: 114/66  Max: 128/74  Pulse  Min: 72  Max: 82  Resp  Min: 16  Max: 18        General: well developed; well nourished  no acute distress   Abdomen: soft, non-tender; no masses  no umbilical or inguinal hernias are present  no hepato-splenomegaly  incision is clean, dry, intact, and without drainage   Pelvic: Not performed   Ext: Calves NT     Results from last 7 days   Lab Units 24  0527 24  1044   WBC 10*3/mm3  --  7.70   HEMOGLOBIN g/dL 11.6* 13.9   HEMATOCRIT % 35.2 40.2   PLATELETS 10*3/mm3  --  183     Lab Results   Component Value Date    RH Positive 2024    HEPBSAG Non-Reactive 2024     Results from last 7 days   Lab Units 24  1045   TREPONEMA PALLIDUM AB TOTAL  Non-Reactive            Assessment   POD #3 S/P Primary  (LTCS)  Doing well     Plan   Discharge to home  Advised no tampons or intercourse for 6 weeks.  D/C questions all answered  Follow-up appointment in 2 week(s)    Jacob Benton MD  2024  07:47 EST

## 2024-11-29 NOTE — PAYOR COMM NOTE
"Chan Ashley Manning (34 y.o. Female)     From:Consuelo Cloud LPN, Utilization Review  Phone #[phone]  Fax #[phone]    [facility] A#[id number]     Discharged 11/28/24 with Baby.          Date of Birth   1990    Social Security Number       Address   [address] [place] [address]    Home Phone   [phone]    MRN   [id number]       Muslim   None    Marital Status                               Admission Date   11/25/24    Admission Type   Elective    Admitting Provider   Jacob Benton MD    Attending Provider       Department, Room/Bed   [facility] MOTHER BABY 4A, N411/1       Discharge Date   11/28/2024    Discharge Disposition   Home or Self Care    Discharge Destination                                 Attending Provider: (none)   Allergies: Penicillins, Ancef [Cefazolin], Sulfa Antibiotics    Isolation: None   Infection: None   Code Status: Prior    Ht: 160 cm (63\")   Wt: 66.2 kg (146 lb)    Admission Cmt: None   Principal Problem: Postpartum care following LTCS 11/25/2024 - Milka [Z39.2]                   Active Insurance as of 11/25/2024       Primary Coverage       Payor Plan Insurance Group Employer/Plan Group    [facility] [id number]       Payor Plan Address Payor Plan Phone Number Payor Plan Fax Number Effective Dates    [address] [address]   6/1/2023 - None Entered    [facility] [id number]         Subscriber Name Subscriber Birth Date Member ID       ASHLEY GILES 1990 [id number]                     Emergency Contacts        (Rel.) Home Phone Work Phone Mobile Phone    brittany rodriguez (Spouse) [phone] -- --              Insurance Information                  [facility]/[employer] Phone: --    Subscriber: Ashley Giles Subscriber#: [id number]    Group#: [id number] Precert#: --    Authorization#: [id number] Effective Date: --             Operative/Procedure Notes (last 7 days) "        Jacob Benton MD at 24 1756          [facility]  Kelli Giles  : 1990  MRN: [id number]  [facility]: [id number]     Section Operative Note    Pre-Operative Dx:   Intrauterine pregnancy at 39w5d weeks   Persistent breech      Postoperative dx:    Same   Intrauterine pregnancy at 39w5d weeks - delivered           Procedure: Primary  (LTCS)  - 2 layer closure       Surgeon: Jacob Benton MD   Assistant: Lanette (scrub tech)  was responsible for performing the following activities: Retraction and Irrigation and their skilled assistance was necessary for the success of this case.       Anesthesia: Spinal       EBL: 350 mls.   IV Fluids: 1400 mls.   UOP: 300 mls.     Antibiotics: cefazolin 2 gms     Infant      Name:  Milka      Gender: female infant    Weight: 3370 g (7 lb 6.9 oz)    Apgars: 5  @ 1 minute / 9  @ 5 minutes     Indications:  Kelli Giles is a  @ 39w5d presenting for primary  due to persistent breech, failing external cephalic version previous    Procedure Details:   After the patient was adequately anesthetized, she was sterilely prepped and draped in the dorsal supine left lateral tilt position. A Pfannenstiel incision was created sharply with the knife. It was carried down to the fascia with the Bovie.  The fascia was cut transversely with the knife and extended in a curvilinear fashion with scissors. The fascia was freed from its midline insertion superiorly and inferiorly. Rectus muscles were  in the midline. The peritoneum was sharply entered and a bladder flap was not sharply created. The lower uterine segment was scored transversely with the knife. Clear amniotic fluid was seen. The infant was in alia breech presentation.  The breech was delivered through the incision and the infant was grasped at the anterior superior iliac spines and delivered until the knees could be flexed and the legs were delivered.  Traction was  used until scapula became evident and then the arms were swept anteriorly across the chest.  The head was delivered in flexion.  The cord was clamped and the infant was handed to the delivery team which was in attendance. The placenta was spontaneously extracted. The uterus was explored with no evidence of a septum seen.  It was exteriorized and wiped free of debris and clot. The uterine incision was closed with #1 chromic in a continuous running locking fashion. A second #1 chromic was used to imbricate across the first.  The bladder flap was reapproximated.    The uterus was returned to the abdomen. The paracolic gutters were cleared of debris and clot. Parietal peritoneum and posterior rectus sheath were closed with 3-0 Vicryl. Rectus muscles were approximated with #1 chromic. The fascia was closed with 0 PDS sewing from either corner, tying in the midline and burying all knots. The subcutaneous tissue was copiously irrigated. Analisa's fascia was closed with 3-0 Vicryl and the skin was closed with the Insorb stapler and skin glue was applied.  All counts were correct.         Complications:   None      Disposition:   Mother to Mother Baby/Postpartum  in stable condition currently.   Baby to NBN  in stable condition currently.       Jacob Benton MD  2024  19:12 EST          Electronically signed by Jacob Benton MD at 24          Discharge Summary        Jacob Benton MD at 24 0749          Discharge Summary     Giorgio Giles  : 1990  MRN: [id number]  CSN: [id number]    Date of Admission: 2024   Date of Discharge:  2024   Delivering Physician: Jacob Benton MD       Admission Diagnosis: Pregnancy at 39w5d  Malpresentation: breech     Discharge Diagnosis: Same as above plus  Pregnancy at 39w5d - delivered   Procedures: Primary  (LTCS)  - 2 layer closure     Hospital Course  See the completed operative report for  details regarding her delivery.    Her post-operative course was unremarkable.  On POD # 3 she felt like she was ready for D/C.  She was evaluated by Dr. Benton who agreed she was able to be discharged to home.  She had no febrile morbidity. She had normal bowel and bladder function and was hemodynamically stable.  Her wound was healing well without obvious signs of infections.    Infant  female fetus weighing 3370 g (7 lb 6.9 oz)  Apgars -  5 @ 1 minute /  9 @ 5 minutes.    Discharge labs  Lab Results   Component Value Date    WBC 7.70 11/22/2024    HGB 11.6 (L) 11/26/2024    HCT 35.2 11/26/2024     11/22/2024       Discharge Medications     Discharge Medications        New Medications        Instructions Start Date   HYDROcodone-acetaminophen 5-325 MG per tablet  Commonly known as: NORCO   1 tablet, Oral, Every 6 Hours PRN      ibuprofen 600 MG tablet  Commonly known as: ADVIL,MOTRIN   600 mg, Oral, Every 6 Hours             Continue These Medications        Instructions Start Date   prenatal vitamin 27-0.8 27-0.8 MG tablet tablet   1 tablet, Daily               Discharge Disposition Home or Self Care   Condition on Discharge: good   Follow-up: 2 weeks with Dr. Chetan Benton MD  11/28/2024      Electronically signed by Jacob Benton MD at 11/28/24 9909

## 2024-12-07 NOTE — PROGRESS NOTES
Subjective   Chief Complaint   Patient presents with    Post-op     Kelli Giles is a 34 y.o. year old  presenting to be seen for her post-operative visit.  Currently she reports no problems with eating, bowel movements, voiding, or wound drainage and pain is well controlled.    There was no pathology result associated with Kelli's recent procedure.      The following portions of the patient's history were reviewed and updated as appropriate:current medications and allergies       Objective   /66   Resp 14   Wt 58.1 kg (128 lb)   LMP 2024   Breastfeeding Yes   BMI 22.67 kg/m²     General:  well developed; well nourished  no acute distress  mentation appropriate   Abdomen: soft, non-tender; no masses  no umbilical or inguinal hernias are present  no hepato-splenomegaly  incision is healing, clean, dry, intact, and without drainage   Pelvis: Not performed.          Assessment   S/P      Plan   OK to drive  OK to lift  Nothing per vagina still until 6 weeks after her procedure  The importance of keeping all planned follow-up and taking all medications as prescribed was emphasized.  Follow up for postpartum visit 1 month.           This note was electronically signed.    Jacob Benton M.D.  December 10, 2024    Part of this note may be an electronic transcription/translation of spoken language to printed text using the Dragon Dictation System.

## 2024-12-09 ENCOUNTER — TELEPHONE (OUTPATIENT)
Dept: LACTATION | Facility: HOSPITAL | Age: 34
End: 2024-12-09
Payer: COMMERCIAL

## 2024-12-09 NOTE — TELEPHONE ENCOUNTER
Kelli called because infant is 2 weeks old not to birth weight, per pediatrician suggestion. Infant born at 7lb 10oz is now 6lb 14oz.  She is breastfeeding 15-40minutes every 3 hours. Latch feels comfortable.   Recommended to pump after breastfeeding to supplement or pump and bottle feed at night, if those feedings are most difficult.   Kelli has a Spectra pump, reviewed pump settings. She uses flange inserts and reports pumping to be comfortable. Recommended using sidelying paced bottle feeding with pregladisie Dr Diego bottle and lips flared out if supplementing. If exclusively pumping and bottle feeding, give 2.5oz per bottle, every 3hrs while pumping for next feedings bottle. Scheduled in Outpatient Lactation Clinic for next available appointment, Tuesday 12/17/2024 at 9:45. Will call if cancellation or opening is available sooner.

## 2024-12-10 ENCOUNTER — MATERNAL SCREENING (OUTPATIENT)
Dept: CALL CENTER | Facility: HOSPITAL | Age: 34
End: 2024-12-10
Payer: COMMERCIAL

## 2024-12-10 ENCOUNTER — HOSPITAL ENCOUNTER (OUTPATIENT)
Dept: LACTATION | Facility: HOSPITAL | Age: 34
Discharge: HOME | End: 2024-12-10

## 2024-12-10 ENCOUNTER — POSTPARTUM VISIT (OUTPATIENT)
Dept: OBSTETRICS AND GYNECOLOGY | Facility: CLINIC | Age: 34
End: 2024-12-10
Payer: COMMERCIAL

## 2024-12-10 VITALS
WEIGHT: 128 LBS | DIASTOLIC BLOOD PRESSURE: 66 MMHG | SYSTOLIC BLOOD PRESSURE: 112 MMHG | BODY MASS INDEX: 22.67 KG/M2 | RESPIRATION RATE: 14 BRPM

## 2024-12-10 DIAGNOSIS — Z98.890 POST-OPERATIVE STATE: Primary | ICD-10-CM

## 2024-12-10 NOTE — OUTREACH NOTE
Maternal Screening Survey      Flowsheet Row Responses   Facility patient discharged from? [facility]   Attempt successful? Yes   Call start time 1009   Call end time 1011   Person spoke with today (if not patient) and relationship patient   I have been able to laugh and see the funny side of things. 0   I have looked forward with enjoyment to things. 0   I have blamed myself unnecessarily when things went wrong. 0   I have been anxious or worried for no good reason. 0   I have felt scared or panicky for no good reason. 0   Things have been getting on top of me. 0   I have been so unhappy that I have had difficulty sleeping. 0   I have felt sad or miserable. 0   I have been so unhappy that I have been crying. 0   The thought of harming myself has occurred to me. 0   [place]  Depression Scale Total 0   Did any of your parents have problems with alcohol or drug use? No   Do any of your peers have problems with alcohol or drug use? No   Does your partner have problems with alcohol or drug use? No   Before you were pregnant did you have problems with alcohol or drug use? (past) No   In the past month, did you drink beer, wine, liquor or use any other drugs? (pregnancy) No   Maternal Screening call completed Yes   Call end time 1011              Carroll KWONG - Registered Nurse

## 2024-12-10 NOTE — LACTATION NOTE
12/10/24 1200   Maternal Information   Person Making Referral patient;physician  (pt aware of Outpt LC Clinic; ped: Dr Vera, suggested follow up also)   Maternal Reason for Referral no prior breastfeeding experience   Infant Reason for Referral weight gain inadequate  (slow to gain, per peds: pump and supplement what is given)   Maternal Assessment   Breast Size Issue none   Breast Shape Bilateral:;round   Breast Density Bilateral:;soft   Nipples Bilateral:;everted   Left Nipple Symptoms intact;tender  (pt reports infant breastfeeds better on left so that side is sore)   Right Nipple Symptoms intact;nontender   Maternal Infant Feeding   Maternal Emotional State independent;receptive;relaxed   Infant Positioning cross-cradle  (bilaterally)   Signs of Milk Transfer deep jaw excursions noted;audible swallow;transfer present   Pain with Feeding no  (per pt report, in deep nipple to nose positioning with unrolling upper lip and bringing shoulders in for deep latch)   Comfort Measures Before/During Feeding infant position adjusted;latch adjusted;maternal position adjusted;suction broken using finger;other (see comments)  (infant popping on & off repeatedly; used an XS NS to help infant maintain latch then removed it after 6 mins once she was able to coordinate; continued breastfeeding remainder of session without NS; best with posterior chin support & unrollingupper lip)   Milk Ejection Reflex present  (strong let-down, milk flowing around nipple shield at start of feeding on right side)   Comfort Measures Following Feeding breast pads utilized   Prefeeding Nipple Condition pink   Postfeeding Nipple Condition reddened;creased   Nipple Shape After Feeding, Left Breast lipstick shape   Nipple Shape After Feeding, Right lipstick shape   Latch Assistance full assistance needed   Additional Documentation Breastfeeding Supplementation (Group)   Breastfeeding Supplementation   Infant Indication for Supplementation  ineffective breastfeeding   Breastfeeding Supplementation Type expressed breast milk   Method of Supplementation paced bottle  (education demonstrated)   Nipple Used For Supplementation very slow flow  (preemie Dr Diego bottle given (also has Transition nipple if full 2.5oz bottle feeding is longer than 20 minutes))   Feeding Infant   Feeding Readiness Cues hand to mouth movements;rooting;crying   Satiety Cues decreased number of sucks   Feeding Tolerance/Success drooling;feeding retained;sleepy;rooting;suck inconsistent;tongue thrusting   Feeding Physical Stress Cues fatigues quickly   Effective Latch During Feeding yes   Suck/Swallow/Breathing Coordination present   Skin-to-Skin Length of Time (minutes) 27   Skin-to-Skin Contact, Duration minutes   Prefeeding Weight (gm) 3150 g (111.1 oz)   Postfeeding Weight (gm) 3200 g (112.9 oz)   Weight Gain/Loss (gm)  50 g (1.8 oz)   Breastfeeding Session   Breastfeeding breastfeeding, bilateral   Breastfeeding Time, Left (min) 2nd breast, 13min, 25ml transferred   Breastfeeding Time, Right (min) 1st breast, 14min, 25ml transferred   Infant-Driven Feeding Readiness©   Infant-Driven Feeding Scales - Readiness 2   Infant-Driven Feeding Scales - Quality 2   Infant-Driven Feeding Scales - Caregiver Strategies A;B;C;G   LATCH Score   Latch 2-->grasps breast, tongue down, lips flanged, rhythmic sucking   Audible Swallowing 2-->spontaneous and intermittent (24 hrs old)   Type of Nipple 2-->everted (after stimulation)   Comfort (Breast/Nipple) 2-->soft/nontender   Hold (Positioning) 1-->minimal assist, teach one side, mother does other, staff holds   Latch Score 9   Milk Expression/Equipment   Breast Pump Type double electric, personal  (Spectra)   Breast Pump Flange Type hard   Breast Pump Flange Size other (see comments)  (using 17mm)   Breast Pumping   Breast Pumping Interventions post-feed pumping encouraged  (for short/missed feedings, if supplementation is required, or if  breastfeeding becomes too painful, to encourage breastmilk production)   Lactation Referrals   Lactation Referrals pediatric care provider;outpatient lactation program   Outpatient Lactation Program Lactation Follow-up Date/Time 2024   Pediatric Care Provider Lactation Follow-up Date/Time 24       Reason for Visit: Kelli Giles comes to the outpatient lactation clinic with infant Milka Kirby ( 2024) due to infant being 2 weeks of age and not to birthweight yet.  She has been breast-feeding every 2-3 hours and feedings have been inconsistent in duration.  She is tender on the left breast because infant has been feeding more on the left side.  After a pediatrician visit and lactation phone consult yesterday, Kelli has been pumping after breast-feeding using a Spectra pump with 17 mm flanges and expressing roughly 1.5 ounces.     Assessment:   Patient: Milka Kirby  : 2024  Current Age: 15 days  Gestational Age at birth: 39w5d  Pediatrician: Edgard Vera  Birth weight: 7lb 6.9oz  Discharge weight: 6lb 12.2oz (-9.02%)  Office weight () : 6lb 15oz  Lactation weight (12/10) : 6lb 15.2oz (3150g)    Minimum Transfer based on current weight (if feeding every 3 hrs):  66ml (2.2oz)    During breast-feeding on the right side for 14 minutes, Milka transferred 25 mL.  When first latching on she was noted to be latching, suckling 1-2 times, then pumping off with breastmilk letdown spraying.  To assist with maintaining latch, an extra small nipple shield was used for the first 6 minutes of that breast-feeding session (with milk flowing around the nipple shield, but infant was not sputtering/coughing).  After initial letdown, extra small nipple shield was removed and Milka was able to continue breast-feeding in a more coordinated fashion.  When moved to the left breast for 13 minutes of breast-feeding without a nipple shield, Milka transferred 25 mL.  Left breast feeding was previously  tender, but adjusting position for nipple to nose aligning in an attempt to extend the chin and unrolled the upper lip demonstrated comfortable latch.  Posterior chin support helpful to lengthening the pulling motion of breast-feeding (and paced bottlefeeding).    During suckle assessment, Milka is noted to be disorganized when attempting to latch, tongue thrusting, repeatedly lose suction once she is latched.  She is lacking posterior suction when assessing using gloved finger.  Unrolling upper lip and providing posterior chin support helped with coordination and pull of suckle significantly.  Additionally, Milka is noted to have significant tension on her left side with feet shooting out in opposite direction of head movement, and significant burrowing when at the breast for feeding.    Recommendations:  For Milk Production: Breast-feed or pump for emptying, every 3 hours  For Breastfeeding Relationship: In daytime, breast-feed for 10 minutes on each side, then pump 10 minutes and supplement with 1 to 1.5 ounces of expressed breastmilk for increasing infant weight.   For Bottlefeeding: At nighttime, consider pumping and side-lying paced bottle of 2.5 to 3 ounces using a preemie Dr. Alves bottle, unrolling upper lip, and providing posterior chin support to encourage deeper pulling.  Before feedings: Utilize pacifier tug-of-war as demonstrated and exercises provided (choose 3 x 30 seconds, each before feedings) to assist with latching on, coordination, and strengthening latch.  For Tension: Consider infant massage/chiropractic/PT to address one-sided tension that is impacting positioning and feeding.  All questions answered at this time.  Follow-ups with pediatrician and lactation scheduled next week.  As needed telephone contact with the outpatient lactation clinic encouraged.

## 2024-12-19 ENCOUNTER — HOSPITAL ENCOUNTER (OUTPATIENT)
Dept: LACTATION | Facility: HOSPITAL | Age: 34
Discharge: HOME | End: 2024-12-19

## 2024-12-19 NOTE — LACTATION NOTE
PATIENT: Milka Kirby  : 24  PEDIATRICIAN: ALLEGRA Burton  AGE: 24 days  GESTATION @ BIRTH: 39 weeks 5 days  BIRTH WEIGHT: 7 lb 6.9 oz  DC WEIGHT: 6 lb 12.2 oz (-9%)  12/10 LACT OFFICE WEIGHT: 6 lb 15.2 oz   LACT OFFICE WEIGHT: 7 lb 9   CURRENT WEIGHT:     MOM'S NAME:  Kelli Giles  : 1990  PHONE: [phone]    REASON FOR VISIT: 2nd appointment, oral exercises, N/P/S for weight gain, pace feeds    Revisit for weighted feed to check for improvement.     BREAST/SUPPLY CONCERNS:   No concerns.  Mom is pumping 1 oz after nursing or 3-4 oz if not putting infant to breast.  Nipples have increased in tenderness but are intact. No redness or bruising.     EXAM/ASSESSMENT:   Milka was placed at the left breast in CC hold and nursed for 11 minutes with stimulation and breast compression.  She was removed from the breast when starting to munch instead of deeply pull.  Milka transferred 30 mls on the left breast.  She was moved to CC hold on the right breast and latched easily.  Appears more comfortable on right  breast in CC than she was on the left breast.  Milka nursed for 9 minutes and transferred 25 mls.  Kelli stated she still felt fullness on the left breast.  Milka was burped and then positioned in FB hold on the left breast.  She latched easily and nursed for 7 minutes.  Milka did require more hand expression with this last stretch to keep her sucking.      In total, Milka nursed for 27 minutes and transferred 75 mls.  For her weight, Milka needs 2.5 oz per feeding if nursing 8 times a day.  This feeding is adequate for her.      For the next week, Kelli is going to exclusively nurse on demand during the day and pump after feeds.  If Milka is not satisfied after nursing, Kelli is going to follow-up with a bottle.  At night, she can pump and bottle feed if desires.  If Milka is gaining well over the next week without requiring post nursing supplementation, we can assume she is not  efficient.  If Milka does not gain appropriately OR she requires supplementation following most feeds, a dental consult may be necessary.      Kelli is going to continue oral stretches and exercises.  Demonstrated lifting and pulling back of tongue for 5 seconds to stretch the lingual frenulum.  Milka does have a thick class III lingual frenulum that could be causing problems but hopefully will stretch and accommodate nursing.      Body tension noted throughout feed, mostly problematic when laying on her right side.  Milka tends to arch and throw her head back per mom prior to latching and latching may take several minutes of wrestling before she accepts the breast and maintains latch. Discussed body work as a possible option to help with positioning and decrease any tension Milka might be holding from her breech delivery.     PLAN OF CARE:   Body work   Should be gaining 5-7 ounces a week, weigh at home and call us if she isn't gaining appropriately.  Also can call dentist at this point for a dental consult (list given).  Tongue stretch prior to every feeding during the day, keep oral exercises going as well.  Breastfeed on demand (at least every 3 hours) and supplement only if Milka is demanding.  Make sure to pump after nursing to protect milk supply.    If Milka is demanding supplementation after most feeds, call for dental consult.   5.  Call lactation follow-up.

## 2025-01-07 ENCOUNTER — TELEPHONE (OUTPATIENT)
Dept: OBSTETRICS AND GYNECOLOGY | Facility: CLINIC | Age: 35
End: 2025-01-07

## 2025-01-07 NOTE — TELEPHONE ENCOUNTER
PROVIDER: DR STARK    CALLER:  ASHLEY TORRES    PHONE NUMBER: [phone]    REASON FOR CALL: SAME DAY CANCELLATION//NOT ABLE TO MAKE IT//RESCHEDULED

## 2025-01-09 ENCOUNTER — POSTPARTUM VISIT (OUTPATIENT)
Dept: OBSTETRICS AND GYNECOLOGY | Facility: CLINIC | Age: 35
End: 2025-01-09
Payer: COMMERCIAL

## 2025-01-09 VITALS
WEIGHT: 133 LBS | BODY MASS INDEX: 23.56 KG/M2 | SYSTOLIC BLOOD PRESSURE: 110 MMHG | RESPIRATION RATE: 14 BRPM | DIASTOLIC BLOOD PRESSURE: 68 MMHG

## 2025-01-09 DIAGNOSIS — Z30.430 ENCOUNTER FOR INTRAUTERINE DEVICE PLACEMENT: ICD-10-CM

## 2025-01-09 DIAGNOSIS — Z30.431 CHECKING OF INTRAUTERINE DEVICE: ICD-10-CM

## 2025-01-09 PROBLEM — Z97.5 IUD (INTRAUTERINE DEVICE) IN PLACE: Status: ACTIVE | Noted: 2025-01-09

## 2025-01-09 NOTE — PROGRESS NOTES
Subjective   Chief Complaint   Patient presents with    Postpartum Care     Kelli Giles is a 34 y.o. year old  presenting to be seen for her postpartum visit.  She had a .  Her daughter is doing well.    She does not have concerns about post-partum blues/depression.   Rutledge Score = 0  She is breast feeding and plans to continues for 1 year(s).  Since delivery she has not been sexually active.  For ongoing contraception, her plans are condoms and IUD - Mirena.    The following portions of the patient's history were reviewed and updated as appropriate:current medications and allergies    Social History    Tobacco Use      Smoking status: Never        Passive exposure: Never      Smokeless tobacco: Never      Review of Systems  Constitutional POS: nothing reported    NEG: anorexia or night sweats   Genitourinary POS: nothing reported    NEG: dysuria or hematuria      Gastointestinal POS: nothing reported    NEG: bloating, change in bowel habits, melena, or reflux symptoms   Breast POS: nothing reported    NEG: persistent breast lump, skin dimpling, or nipple discharge        Objective   /68   Resp 14   Wt 60.3 kg (133 lb)   LMP 2024   Breastfeeding Yes   BMI 23.56 kg/m²     General:  well developed; well nourished  no acute distress  mentation appropriate   Abdomen: soft, non-tender; no masses  no umbilical or inguinal hernias are present  no hepato-splenomegaly  incision is clean, dry, intact, and without drainage   Pelvis: Clinical staff was present for exam  External genitalia:  normal appearance of the external genitalia including Bartholin's and [place]'s glands.  :  urethral meatus normal;  Vaginal:  normal pink mucosa without prolapse or lesions.  Cervix:  normal appearance.  Uterus:  normal size, shape and consistency.  Adnexa:  normal bimanual exam of the adnexa.  Rectal:  digital rectal exam not performed; anus visually normal appearing.          Assessment   Normal  6 week postpartum exam S/P      Plan   BC options reviewed and compared today: IUD - Mirena and IUD - Paraguard  The importance of keeping all planned follow-up and taking all medications as prescribed was emphasized.  Follow up for IUD placement            This note was electronically signed.    Jacob Benton M.D.  2025    Part of this note may be an electronic transcription/translation of spoken language to printed text using the Dragon Dictation System.

## 2025-01-09 NOTE — PROGRESS NOTES
IUD Insertion    Patient's last menstrual period was 03/03/2024.    Date of procedure:  1/9/2025    Risks and benefits discussed? yes  All questions answered? yes  Consents given by The patient  Written consent obtained? yes    Local anesthesia used:  no    Procedure documentation:    After verifying the patient had a low probability of being pregnant and met the criteria for insertion, a sterile speculum has placed and the cervix was cleansed with an antiseptic solution.  Vaginal discharge was scant.  The anterior lip of the cervix was grasped with a tenaculum and the uterine cavity was gently sounded. There was no difficulty passing the sound through the cervix.  Cervical dilation did not need to be performed prior to placing the IUD.  The uterus was midplane and sounded to 7 cms.  The Mirena was then prepared per the manufacturers instructions.    The Mirena was advanced to a point 2 cms from the fundus and then the arms were released from the sheath.  The device was advanced to the fundus and the device was released fully from the sheath. The string was cut 2 cms in length.  Bleeding from the cervix was scant.    She tolerated the procedure without any difficulty.     Post procedure instructions: It was reviewed that the Mirena will not alter the timing of when she bleeds but it may decrease the quantity of flow and cramps.  Roughly 30% of people will be amenorrheic over time.  Efficacy rate of 99.2% over 8 years was discussed.  Spontaneous expulsion rate of 1-2% was also discussed.  If she has any issue with fever or excessive bleeding or pain she is to call the office immediately.  Otherwise I would like to see her back in 6 weeks with an ultrasound to confirm correct placement.    This note was electronically signed.    Jacob Benton M.D.  January 9, 2025

## 2025-02-20 ENCOUNTER — OFFICE VISIT (OUTPATIENT)
Dept: OBSTETRICS AND GYNECOLOGY | Facility: CLINIC | Age: 35
End: 2025-02-20
Payer: COMMERCIAL

## 2025-02-20 VITALS — BODY MASS INDEX: 24.98 KG/M2 | RESPIRATION RATE: 14 BRPM | WEIGHT: 141 LBS

## 2025-02-20 DIAGNOSIS — Z30.433 ENCOUNTER FOR REMOVAL AND REINSERTION OF INTRAUTERINE CONTRACEPTIVE DEVICE (IUD): ICD-10-CM

## 2025-02-20 DIAGNOSIS — Z30.431 CHECKING OF INTRAUTERINE DEVICE: Primary | ICD-10-CM

## 2025-02-20 PROCEDURE — 99213 OFFICE O/P EST LOW 20 MIN: CPT | Performed by: OBSTETRICS & GYNECOLOGY

## 2025-02-20 NOTE — PROGRESS NOTES
IUD Removal and Immediate Reinsertion    No LMP recorded. Patient has had an implant.    Date of procedure:  2/20/2025    Risks and benefits discussed? yes  All questions answered? yes  Consents given by the patient  Written consent obtained? yes  Reason for removal: Suboptimal location by imaging    Local anesthesia used:  no    Procedure documentation:    A speculum was placed in order to view the cervix.  A tenaculum did not need to be placed on the anterior cervical lip.  Cervical dilation did not need to be performed in order to access the string.  The IUD string was easily seen.  The string was grasped and the IUD was removed without difficulty.  The IUD did appear to be adherent to the uterine cavity. It was removed intact.    A sterile speculum was replaced and the cervix was cleansed with an antiseptic solution.  Vaginal discharge was scant.  The anterior lip of the cervix was grasped with a tenaculum and the uterine cavity was gently sounded.  There was no difficulty passing the sound through the cervix.  Cervical dilation did not need to be performed prior to placing the IUD.  The uterus was retroverted and sounded to 8 cms.  The Mirena was then prepared per the manufacturers instructions.    The Mirena was advanced to a point 2 cms from the fundus and then the arms were released from the sheath.  The device was advanced to the fundus and the device was released fully from the sheath.. The string was cut 2 cms in length.  Bleeding from the cervix was scant.    She tolerated the procedure without any difficulty.     Post procedure instructions: It was reviewed that the Mirena will not alter the timing of when she bleeds but it may decrease the quantity of flow and cramps.  Roughly 30% of people will be amenorrheic over time.  Efficacy rate of 99.2% over 8 years was discussed.  Spontaneous expulsion rate of 1-2% was also discussed.  If she has any issue with fever or excessive bleeding or pain she is to  call the office immediately.  Otherwise I would like to see her back in 6 weeks with an ultrasound to confirm correct placement.    This note was electronically signed.    Jacob Benton M.D.  February 20, 2025

## 2025-02-20 NOTE — PROGRESS NOTES
Subjective   Chief Complaint   Patient presents with    IUD f/u     Kelli Giles is a 34 y.o. year old  presenting to be seen for follow-up of her recently placed Mirena.  Since the time of insertion she has not been sexually active.  She has had no real issues with pain or bleeding since placement         Objective   Resp 14   Wt 64 kg (141 lb)   Breastfeeding Yes   BMI 24.98 kg/m²     Imaging   Pelvic ultrasound images independantly reviewed - uterus is retroflexed and the IUD is in the cervix       Assessment   Malposition of Mirena IUD     Plan   IUD needs to be removed.  Could replace it on the same day if she wishes or we could use alternative contraception if she otherwise does not want to repeat the IUD  The importance of keeping all planned follow-up and taking all medications as prescribed was emphasized.  Follow up PRN             Total time spent today with Kelli  was 20 minutes (level 3).  Greater than 50% of the time was spent coordinating care, answering her questions and counseling regarding pathophysiology of her presenting problem along with plans for any diagnositc work-up and treatment.  The time reported only includes face-to-face time and excludes any procedural based activities that occurred on the same date of service.    This note was electronically signed.    Jacob Benton M.D.  2025    Part of this note may be an electronic transcription/translation of spoken language to printed text using the Dragon Dictation System.

## 2025-03-26 ENCOUNTER — TELEPHONE (OUTPATIENT)
Dept: OBSTETRICS AND GYNECOLOGY | Facility: CLINIC | Age: 35
End: 2025-03-26
Payer: COMMERCIAL

## 2025-03-26 NOTE — TELEPHONE ENCOUNTER
PATIENT CONTACTED REGARDING THE ESTIMATE FOR THEIR UPCOMING ULTRASOUND. PATIENTS ESTIMATED COST IS $139.75. LEFT VOICEMAIL FOR PATIENT TO CALL THE OFFICE OR SEND A MESSAGE IF SHE HAS ANY QUESTIONS. OKAY FOR HUB TO INFORM PATIENT OF THIS.

## 2025-04-03 ENCOUNTER — OFFICE VISIT (OUTPATIENT)
Dept: OBSTETRICS AND GYNECOLOGY | Facility: CLINIC | Age: 35
End: 2025-04-03
Payer: COMMERCIAL

## 2025-04-03 VITALS — WEIGHT: 135 LBS | BODY MASS INDEX: 23.91 KG/M2 | RESPIRATION RATE: 14 BRPM

## 2025-04-03 DIAGNOSIS — Z30.431 CHECKING OF INTRAUTERINE DEVICE: Primary | ICD-10-CM

## 2025-04-03 PROCEDURE — 99212 OFFICE O/P EST SF 10 MIN: CPT | Performed by: OBSTETRICS & GYNECOLOGY

## 2025-04-03 NOTE — PROGRESS NOTES
Subjective   Chief Complaint   Patient presents with    Follow-up     Kelli Giles is a 34 y.o. year old  presenting to be seen for follow-up of her recently placed Mirena.  Since the time of insertion she has been sexually active.  [place] has not been painful for her.   [place] has not been painful for her partner.  There has been no issues with abnormal bleeding or discomfort.         Objective   Resp 14   Wt 61.2 kg (135 lb)   BMI 23.91 kg/m²     Imaging   Pelvic ultrasound report       Assessment   Appropriate placement of Mirena IUD     Plan   The importance of keeping all planned follow-up and taking all medications as prescribed was emphasized.  Follow up for annual exam 2026     No orders of the defined types were placed in this encounter.             This note was electronically signed.    Jacob Benton M.D.  April 3, 2025    Part of this note may be an electronic transcription/translation of spoken language to printed text using the Dragon Dictation System.

## 2025-04-05 NOTE — LACTATION NOTE
Courtesy follow up visit for newly postpartum couplet. MOB reports well. Denies needs, questions or concerns at this time. Encouraged to let RN know if lactation needs arise during hospitalization, verbalized understanding.    No

## 2025-05-07 ENCOUNTER — LAB (OUTPATIENT)
Age: 35
End: 2025-05-07
Payer: COMMERCIAL

## 2025-05-07 ENCOUNTER — OFFICE VISIT (OUTPATIENT)
Age: 35
End: 2025-05-07
Payer: COMMERCIAL

## 2025-05-07 VITALS
SYSTOLIC BLOOD PRESSURE: 112 MMHG | HEART RATE: 86 BPM | WEIGHT: 133.19 LBS | BODY MASS INDEX: 23.6 KG/M2 | HEIGHT: 63 IN | DIASTOLIC BLOOD PRESSURE: 70 MMHG | OXYGEN SATURATION: 98 %

## 2025-05-07 DIAGNOSIS — Z00.00 WELL ADULT EXAM: ICD-10-CM

## 2025-05-07 DIAGNOSIS — Z00.00 WELL ADULT EXAM: Primary | ICD-10-CM

## 2025-05-07 LAB
DEPRECATED RDW RBC AUTO: 43.3 FL (ref 37–54)
ERYTHROCYTE [DISTWIDTH] IN BLOOD BY AUTOMATED COUNT: 13 % (ref 12.3–15.4)
HCT VFR BLD AUTO: 48.2 % (ref 34–46.6)
HGB BLD-MCNC: 15.3 G/DL (ref 12–15.9)
MCH RBC QN AUTO: 29.3 PG (ref 26.6–33)
MCHC RBC AUTO-ENTMCNC: 31.7 G/DL (ref 31.5–35.7)
MCV RBC AUTO: 92.3 FL (ref 79–97)
PLATELET # BLD AUTO: 248 10*3/MM3 (ref 140–450)
PMV BLD AUTO: 10.9 FL (ref 6–12)
RBC # BLD AUTO: 5.22 10*6/MM3 (ref 3.77–5.28)
WBC NRBC COR # BLD AUTO: 6.53 10*3/MM3 (ref 3.4–10.8)

## 2025-05-07 PROCEDURE — 80061 LIPID PANEL: CPT | Performed by: FAMILY MEDICINE

## 2025-05-07 PROCEDURE — 84439 ASSAY OF FREE THYROXINE: CPT | Performed by: FAMILY MEDICINE

## 2025-05-07 PROCEDURE — 80053 COMPREHEN METABOLIC PANEL: CPT | Performed by: FAMILY MEDICINE

## 2025-05-07 PROCEDURE — 85027 COMPLETE CBC AUTOMATED: CPT | Performed by: FAMILY MEDICINE

## 2025-05-07 PROCEDURE — 36415 COLL VENOUS BLD VENIPUNCTURE: CPT | Performed by: FAMILY MEDICINE

## 2025-05-07 PROCEDURE — 84443 ASSAY THYROID STIM HORMONE: CPT | Performed by: FAMILY MEDICINE

## 2025-05-07 PROCEDURE — 99395 PREV VISIT EST AGE 18-39: CPT | Performed by: FAMILY MEDICINE

## 2025-05-07 RX ORDER — PRENATAL VIT/IRON FUM/FOLIC AC 27MG-0.8MG
TABLET ORAL DAILY
COMMUNITY

## 2025-05-07 NOTE — PROGRESS NOTES
Chief Complaint  Annual Exam    Subjective              Kelli Giles presents to [facility] for   History of Present Illness  History of Present Illness  The patient is a 34-year-old female presenting for an annual physical exam.    She has been under the care of her OB/GYN, maintaining regular check-ups. She reports no significant health changes over the past year, attributing most of her recent experiences to pregnancy and postpartum recovery. She maintains a balanced diet, ensuring adequate hydration with approximately 110 ounces of water daily. Her exercise routine is active, focusing on cardiovascular activities. She reports no new health concerns related to sleep, bowel, or bladder functions. She has been diligent with her dental check-ups and reports no changes in her vision. She experienced some hair shedding during pregnancy but notes that her nails have thickened. She also reports skin darkening on her abdomen associated with pregnancy. She is currently breastfeeding her infant, utilizing both nursing and pumping methods. She is on prenatal vitamins and omega-3 supplements with DHA.    Her sleep has improved since the birth of her child, despite previous indications of sleep apnea from a sleep study.    She reports numbness and tingling around her surgical incision on her lower abdomen, suspecting potential nerve damage. She also experiences occasional tightness in the area of the sutures at right lower abdomen, which she believes may resolve over time. She has been using silicone tape on the scar for comfort and to prevent irritation from her clothing.    Little interest or pleasure in doing things? Not at all   Feeling down, depressed, or hopeless? Not at all   PHQ-2 Total Score 0        Objective   Vital Signs:   Vitals:    05/07/25 0826   BP: 112/70   BP Location: Left arm   Patient Position: Sitting   Cuff Size: Adult   Pulse: 86   SpO2: 98%   Weight: 60.4 kg (133  "lb 3 oz)   Height: 160 cm (63\")     Body mass index is 23.59 kg/m².    BMI is within normal parameters. No other follow-up for BMI required.        The following portions of the patient's history were reviewed and updated as appropriate: allergies, current medications, past family history, past medical history, past social history, past surgical history, and problem list.      Physical Exam  Constitutional:       General: She is not in acute distress.  HENT:      Right Ear: Tympanic membrane and ear canal normal.      Left Ear: Tympanic membrane and ear canal normal.      Nose: No congestion or rhinorrhea.      Mouth/Throat:      Mouth: Mucous membranes are moist.      Pharynx: No oropharyngeal exudate or posterior oropharyngeal erythema.   Eyes:      General:         Right eye: No discharge.         Left eye: No discharge.      Conjunctiva/sclera: Conjunctivae normal.   Neck:      Thyroid: No thyromegaly.   Cardiovascular:      Rate and Rhythm: Normal rate and regular rhythm.   Pulmonary:      Effort: Pulmonary effort is normal.      Breath sounds: Normal breath sounds.   Abdominal:      Palpations: Abdomen is soft. There is no hepatomegaly or mass.      Tenderness: There is no abdominal tenderness.      Comments: Surgical scar well healed   Musculoskeletal:      Cervical back: Neck supple.      Right lower leg: No edema.      Left lower leg: No edema.   Lymphadenopathy:      Head:      Right side of head: No submandibular, preauricular or posterior auricular adenopathy.      Left side of head: No submandibular, preauricular or posterior auricular adenopathy.      Cervical: No cervical adenopathy.   Skin:     General: Skin is warm.   Neurological:      Mental Status: She is alert and oriented to person, place, and time.   Psychiatric:         Mood and Affect: Mood normal.         Behavior: Behavior normal.         Thought Content: Thought content normal.         Judgment: Judgment normal.        Result Review : "                Immunization History   Administered Date(s) Administered    ABRYSVO (RSV, 60+ or pregnant women 32-36 wks) 10/29/2024    Hepatitis A 01/21/2021    Tdap 12/02/2022       Health Maintenance   Topic Date Due    COVID-19 Vaccine (1 - 2024-25 season) Never done    ANNUAL PHYSICAL  05/06/2025    INFLUENZA VACCINE  07/01/2025    PAP SMEAR  12/01/2025    TDAP/TD VACCINES (2 - Td or Tdap) 12/02/2032    HEPATITIS C SCREENING  Completed    Pneumococcal Vaccine 0-49  Aged Out            Assessment and Plan    Diagnoses and all orders for this visit:    1. Well adult exam (Primary)  -     CBC (No Diff); Future  -     Comprehensive Metabolic Panel; Future  -     Lipid Panel; Future  -     TSH Rfx On Abnormal To Free T4; Future        Assessment & Plan  1. Annual physical examination.  - Her overall health status is satisfactory. She is currently breastfeeding and taking prenatal vitamins and omega-3 supplements with DHA.  - A comprehensive metabolic panel will be ordered to monitor blood sugar levels, kidney function, liver function, electrolytes, thyroid function, and a complete blood count. A cholesterol panel will also be ordered as she is fasting today.  - The results will be communicated via [facility] upon receipt and interpretation.  - Vaccinations are current, including the RSV vaccine during pregnancy and tetanus.   She has an IUD in place and is under the care of her OB/GYN with regular checks.  - She reports numbness and tingling around her surgical incisions, likely due to nerve damage from the incision.  - There is also tightness in the area, possibly related to the rectus abdominal muscles. It was explained that sensation might return over time, but there is no definitive timeline.    Follow-up  The patient will follow up in 1 year for a physical exam or sooner if needed.    Counseling/anticipatory guidance: Nutrition, physical activity, dental health,  immunizations, screenings      Follow Up   Return  in about 1 year (around 5/8/2026).  Patient was given instructions and counseling regarding her condition or for health maintenance advice. Please see specific information pulled into the AVS if appropriate.     Patient or patient representative verbalized consent for the use of Ambient Listening during the visit with  Janelle Grimm MD for chart documentation. 5/7/2025  08:38 EDT     Electronically signed by Janelle Grimm MD, 05/07/25, 8:44 AM EDT.

## 2025-05-08 LAB
ALBUMIN SERPL-MCNC: 4.6 G/DL (ref 3.5–5.2)
ALBUMIN/GLOB SERPL: 1.3 G/DL
ALP SERPL-CCNC: 56 U/L (ref 39–117)
ALT SERPL W P-5'-P-CCNC: 21 U/L (ref 1–33)
ANION GAP SERPL CALCULATED.3IONS-SCNC: 11.4 MMOL/L (ref 5–15)
AST SERPL-CCNC: 22 U/L (ref 1–32)
BILIRUB SERPL-MCNC: 0.4 MG/DL (ref 0–1.2)
BUN SERPL-MCNC: 20 MG/DL (ref 6–20)
BUN/CREAT SERPL: 23.5 (ref 7–25)
CALCIUM SPEC-SCNC: 9.6 MG/DL (ref 8.6–10.5)
CHLORIDE SERPL-SCNC: 104 MMOL/L (ref 98–107)
CHOLEST SERPL-MCNC: 182 MG/DL (ref 0–200)
CO2 SERPL-SCNC: 23.6 MMOL/L (ref 22–29)
CREAT SERPL-MCNC: 0.85 MG/DL (ref 0.57–1)
EGFRCR SERPLBLD CKD-EPI 2021: 92.3 ML/MIN/1.73
GLOBULIN UR ELPH-MCNC: 3.5 GM/DL
GLUCOSE SERPL-MCNC: 80 MG/DL (ref 65–99)
HDLC SERPL-MCNC: 62 MG/DL (ref 40–60)
LDLC SERPL CALC-MCNC: 113 MG/DL (ref 0–100)
LDLC/HDLC SERPL: 1.83 {RATIO}
POTASSIUM SERPL-SCNC: 4.2 MMOL/L (ref 3.5–5.2)
PROT SERPL-MCNC: 8.1 G/DL (ref 6–8.5)
SODIUM SERPL-SCNC: 139 MMOL/L (ref 136–145)
T4 FREE SERPL-MCNC: 1.47 NG/DL (ref 0.92–1.68)
TRIGL SERPL-MCNC: 32 MG/DL (ref 0–150)
TSH SERPL DL<=0.05 MIU/L-ACNC: 4.71 UIU/ML (ref 0.27–4.2)
VLDLC SERPL-MCNC: 7 MG/DL (ref 5–40)

## 2025-05-09 ENCOUNTER — RESULTS FOLLOW-UP (OUTPATIENT)
Age: 35
End: 2025-05-09
Payer: COMMERCIAL

## 2025-06-17 ENCOUNTER — LAB (OUTPATIENT)
Age: 35
End: 2025-06-17
Payer: COMMERCIAL

## 2025-06-17 ENCOUNTER — OFFICE VISIT (OUTPATIENT)
Age: 35
End: 2025-06-17
Payer: COMMERCIAL

## 2025-06-17 VITALS
WEIGHT: 135.19 LBS | OXYGEN SATURATION: 98 % | HEART RATE: 76 BPM | DIASTOLIC BLOOD PRESSURE: 70 MMHG | HEIGHT: 63 IN | BODY MASS INDEX: 23.95 KG/M2 | SYSTOLIC BLOOD PRESSURE: 116 MMHG

## 2025-06-17 DIAGNOSIS — R79.89 ELEVATED TSH: ICD-10-CM

## 2025-06-17 DIAGNOSIS — M62.08 DIASTASIS RECTI: ICD-10-CM

## 2025-06-17 DIAGNOSIS — R06.83 SNORING: Primary | ICD-10-CM

## 2025-06-17 PROCEDURE — 36415 COLL VENOUS BLD VENIPUNCTURE: CPT | Performed by: FAMILY MEDICINE

## 2025-06-17 PROCEDURE — 99214 OFFICE O/P EST MOD 30 MIN: CPT | Performed by: FAMILY MEDICINE

## 2025-06-17 PROCEDURE — 84443 ASSAY THYROID STIM HORMONE: CPT | Performed by: FAMILY MEDICINE

## 2025-06-17 PROCEDURE — 84439 ASSAY OF FREE THYROXINE: CPT | Performed by: FAMILY MEDICINE

## 2025-06-17 NOTE — PROGRESS NOTES
"Chief Complaint  Snoring (Discuss cpap ) and Thyroid Problem    Subjective        Kelli Giles presents to [facility]  History of Present Illness    History of Present Illness  The patient is a 34-year-old female presenting for follow-up on a thyroid problem and to discuss snoring with sleep apnea.    She reports no significant changes in her health since the last visit. She is here for a follow-up because her recent lab results indicated slightly abnormal thyroid levels, and she wants to address this and related concerns. She has been experiencing persistent fatigue, which she attributes to her history of sleep disturbances, potential thyroid issues, and the demands of caring for her infant. Despite her active lifestyle, she has not observed any weight loss postpartum, which she finds surprising. She is currently breastfeeding and has not noticed any acute symptoms such as hair loss.    She has a long-standing history of sleep disturbances, which she believes may be due to sleep apnea. She identifies as a mouth breather and has undergone a sleep study in the past, which yielded normal results. However, she continues to experience symptoms such as snoring and waking up gasping for air. She is interested in exploring the use of a CPAP machine and plans to discuss the possibility of an oral appliance with her dentist.    She also reports a separation of her abdominal muscles, known as diastasis recti, and is seeking recommendations for physical therapy. She does not report any issues with core strength or stability and has been engaging in breath work and core exercises. 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    Objective   Vital Signs:  /70 (BP Location: Left arm, Patient Position: Sitting, Cuff Size: Adult)   Pulse 76   Ht 160 cm (63\")   Wt 61.3 kg (135 lb 3 oz)   SpO2 98%   BMI 23.95 kg/m²   Estimated body mass index is 23.95 kg/m² as calculated from " "the following:    Height as of this encounter: 160 cm (63\").    Weight as of this encounter: 61.3 kg (135 lb 3 oz).    BMI is within normal parameters. No other follow-up for BMI required.      The following portions of the patient's history were reviewed and updated as appropriate: allergies, current medications, past family history, past medical history, past social history, past surgical history, and problem list.       Physical Exam  Vitals reviewed.   Constitutional:       General: She is not in acute distress.     Appearance: She is not ill-appearing.   Neck:      Thyroid: No thyroid mass, thyromegaly or thyroid tenderness.   Cardiovascular:      Rate and Rhythm: Normal rate and regular rhythm.   Pulmonary:      Effort: Pulmonary effort is normal.      Breath sounds: Normal breath sounds.   Abdominal:      Hernia: A hernia is present. Hernia is present in the umbilical area (diastasis recti).   Neurological:      Mental Status: She is alert.        Result Review :                Assessment and Plan   Diagnoses and all orders for this visit:    1. Snoring (Primary)  -     Ambulatory Referral to Sleep Medicine    2. Elevated TSH  -     TSH; Future  -     T4, Free; Future    3. Diastasis recti  -     Ambulatory Referral to Physical Therapy for Evaluation & Treatment             Assessment & Plan  1.  Borderline TSH  - TSH levels are marginally elevated at 4, while T4 levels remain within the normal range at 1.47, indicating a subclinical state.  - Follow-up lab test will be conducted today to monitor TSH levels, with results to be shared via [facility].  - If TSH level exceeds 10 or T4 level falls below 0.8, initiation of levothyroxine therapy may be considered.  - If follow-up labs indicate hypothyroidism, a regimen of levothyroxine will be initiated. The importance of taking levothyroxine on an empty stomach has been emphasized. Levothyroxine is safe for use during breastfeeding.    2. Suspected sleep apnea.  - " Reports symptoms including snoring, waking up gasping, and being a mouth breather.  - Referral to the sleep clinic will be made for a home sleep test to determine if she qualifies for a CPAP machine.  - Considering discussing an oral appliance with her dentist.    3. Diastasis recti.  - Reports that her abdominal muscles are still far apart postpartum.  - Performing breath work and core exercises but seeking further recommendations.  - Referral to [facility] for physical therapy will be made, although no specific specialist for diastasis recti is known.      Follow Up   Return if symptoms worsen or fail to improve.  Patient was given instructions and counseling regarding her condition or for health maintenance advice. Please see specific information pulled into the AVS if appropriate.         Patient or patient representative verbalized consent for the use of Ambient Listening during the visit with  Janelle Grimm MD for chart documentation. 6/17/2025  08:33 EDT    Electronically signed by Janelle Grimm MD, 06/17/25, 8:33 AM EDT.

## 2025-06-18 LAB
T4 FREE SERPL-MCNC: 1.39 NG/DL (ref 0.92–1.68)
TSH SERPL DL<=0.05 MIU/L-ACNC: 4.21 UIU/ML (ref 0.27–4.2)

## 2025-06-23 ENCOUNTER — OFFICE VISIT (OUTPATIENT)
Dept: SLEEP MEDICINE | Facility: CLINIC | Age: 35
End: 2025-06-23
Payer: COMMERCIAL

## 2025-06-23 VITALS
DIASTOLIC BLOOD PRESSURE: 68 MMHG | BODY MASS INDEX: 24.1 KG/M2 | TEMPERATURE: 97 F | HEART RATE: 68 BPM | SYSTOLIC BLOOD PRESSURE: 110 MMHG | WEIGHT: 136 LBS | OXYGEN SATURATION: 98 % | HEIGHT: 63 IN

## 2025-06-23 DIAGNOSIS — R06.83 SNORING: Primary | ICD-10-CM

## 2025-06-23 DIAGNOSIS — Z72.820 POOR SLEEP: ICD-10-CM

## 2025-06-23 PROCEDURE — 99214 OFFICE O/P EST MOD 30 MIN: CPT | Performed by: INTERNAL MEDICINE

## 2025-06-23 NOTE — PROGRESS NOTES
Follow Up Office Visit      Patient Name: Kelli Giles    Chief Complaint:    Chief Complaint   Patient presents with    Daytime Sleepiness       History of Present Illness: Kelli Giles is a 34 y.o. female who is here today for follow up of Poor sleep    Pleasant 34-year-old female with no significant past medical problems coming here for follow-up.  Patient states that she is having problem with loud snoring and poor quality sleep and nonrestorative sleep for a number of years.  Previously was seen by Dr. Ramos for similar issues 2 years back.  Underwent home sleep study and will polysomnogram at that time.  States that she is still has similar issues going on.  She feels that she does not sleep very well.  She is mostly awake during the night and any little noise wakes her up.  She does snore loudly.  Does wake up with dry mouth.  Does feel fatigued and tired during the daytime but sleepiness is not affecting her quality of life or her work schedule.  She works as a .  Denies any shiftwork.  Denies any driving problems or sleep-related accidents.  Denies any sleep attacks during the daytime.  Denies any sleep paralysis frequently.  Denies any hallucinations during sleep or coming out of sleep.  Denies any cataplexy symptoms.  No history of head injury or head trauma.  Patient has 6-month-old baby in states that her symptoms are predating the pregnancy and delivery.  Baby is sleeping well not affecting her sleep much at this point.  She has mild residual pregnancy weight gain about 10 pounds but improving.  She states that she is very active and works out frequently.  Denies any mood disorders.      Subjective      Review of Systems:   Review of Systems   Constitutional:  Positive for fatigue.   HENT: Negative.     Respiratory: Negative.     Cardiovascular: Negative.    Gastrointestinal: Negative.    Endocrine: Negative.    Musculoskeletal: Negative.    Skin: Negative.    Neurological:  "Negative.    Hematological: Negative.    Psychiatric/Behavioral:  Positive for sleep disturbance.    All other systems reviewed and are negative.      The following portions of the patient's history were reviewed and updated as appropriate: allergies, current medications, past family history, past medical history, past social history, past surgical history and problem list.    Objective     Physical Exam:  Vital Signs:   Vitals:    06/23/25 0850   BP: 110/68   BP Location: Left arm   Patient Position: Sitting   Cuff Size: Adult   Pulse: 68   Temp: 97 °F (36.1 °C)   SpO2: 98%  Comment: room air at rest   Weight: 61.7 kg (136 lb)   Height: 160 cm (63\")     Body mass index is 24.09 kg/m².    Physical Exam  Vitals and nursing note reviewed.   Constitutional:       General: She is not in acute distress.     Appearance: She is well-developed. She is not diaphoretic.   HENT:      Head: Normocephalic and atraumatic.      Comments: Mallampati 3 airway with mild retrognathia, redundant soft palate and crowded posterior airway.     Nose: Nose normal.   Eyes:      General:         Right eye: No discharge.         Left eye: No discharge.      Pupils: Pupils are equal, round, and reactive to light.   Neck:      Thyroid: No thyromegaly.      Trachea: No tracheal deviation.   Cardiovascular:      Rate and Rhythm: Normal rate and regular rhythm.      Heart sounds: Normal heart sounds. No murmur heard.     No friction rub. No gallop.   Pulmonary:      Effort: Pulmonary effort is normal. No respiratory distress.      Breath sounds: Normal breath sounds. No wheezing or rales.   Musculoskeletal:         General: No tenderness.      Cervical back: Neck supple.      Right lower leg: No edema.      Left lower leg: No edema.   Neurological:      Mental Status: She is alert and oriented to person, place, and time.   Psychiatric:         Behavior: Behavior normal.         Thought Content: Thought content normal.         Judgment: Judgment " normal.         Results Review:   I reviewed the patient's new clinical results.    Previous sleep studies reviewed including home study which was negative for any sleep disordered breathing .  Mild snoring noted during that study and patient had hypoxia with 41 minutes of time spent below 90% oxygen saturation.    Subsequently patient had a low polysomnogram which showed near normal sleep architecture with REM sleep 21% with REM latency of 108 minutes.  Sleep efficiency was 95.7 minutes.  No significant sleep disordered breathing with AHI of 1.5 but RDI was elevated at 5.4.  Arousal index was elevated at 15.4.  There were some parotic limb movements noted.  No significant hypoxia noted during this study with lowest oxygen saturation of 88%.    Assessment / Plan      Assessment:   Problem List Items Addressed This Visit    None  Visit Diagnoses         Snoring    -  Primary    Relevant Orders    Mandible Advancing Appliance      Poor sleep                Plan:   1.  Patient here with complaints of snoring, poor quality sleep and frequent arousals.  Based on sleep study patient has likely low arousal threshold.  She did not meet criteria for sleep apnea.  May have a.  Resistance syndrome.  She does have some anatomical abnormalities which is potentiating that pathology.  Discussed at length with her about different options here.  CPAP would be a treatment option but given low severity of her sleep disordered breathing in my opinion could be difficult to tolerate.  On the other hand given her significant snoring as well as well as upper airway abnormalities I think mandibular advancing device would be more desirable than beneficial in terms of improving snoring frequency and also hopefully to improve on the arousal threshold.  Discussed pros and cons of both approaches.  Patient is willing to try mandibular advancing device.  We will go ahead and refer her back to her dentist at from [facility] and get  their opinion.  If she is not candidate for mandibular advancement device because of her dentition or other oral issues then we will look for alternate options.  Patient verbalized understanding and is agreeable to proceed.  Prescription given.    2.  Other option could be to use sleep aid to improve on the arousal threshold.  Given  baby and breast-feeding I think we will hold off on that for now.  In the meantime patient was advised to continue with her good sleep hygiene habits.  Continue exercise routine.  Will follow closely.    Patient had no further questions at this point.  Will follow her in sleep clinic in 6 to 7 months or sooner as needed.    Follow Up:   6 months    Discussed plan of care in detail with patient today. Patient verbally understands and agrees. I spent 35 minutes on this date of service. This time includes time spent by me in the following activities:preparing for the visit, reviewing tests, obtaining and/or reviewing a separately obtained history, performing a medically appropriate examination, counseling the patient, ordering tests, or procedures, and/or documenting information in the medical record. This time excludes other separate billable services such as interpretation of tests or procedures, if applicable.       Clifford Parham MD  Pulmonary Critical Care and Sleep Medicine

## 2025-06-24 ENCOUNTER — TREATMENT (OUTPATIENT)
Dept: PHYSICAL THERAPY | Facility: CLINIC | Age: 35
End: 2025-06-24
Payer: COMMERCIAL

## 2025-06-24 DIAGNOSIS — M62.08 DIASTASIS RECTI: Primary | ICD-10-CM

## 2025-06-24 DIAGNOSIS — R19.8 ABDOMINAL WEAKNESS: ICD-10-CM

## 2025-06-24 PROCEDURE — 97112 NEUROMUSCULAR REEDUCATION: CPT | Performed by: PHYSICAL THERAPIST

## 2025-06-24 PROCEDURE — 97161 PT EVAL LOW COMPLEX 20 MIN: CPT | Performed by: PHYSICAL THERAPIST

## 2025-06-24 NOTE — PROGRESS NOTES
"Physical Therapy Initial Evaluation and Plan of Care  [address]    [place] [address]  [phone]    Patient: Kelli Giles   : 1990  Diagnosis/ICD-10 Code:  Diastasis recti [M62.08]  Referring practitioner: Janelle Grimm MD  Date of Initial Visit: 2025  Today's Date: 2025  Patient seen for 1 session         Visit Diagnoses:    ICD-10-CM ICD-9-CM   1. Diastasis recti  M62.08 728.84   2. Abdominal weakness  R19.8 789.9         Subjective Questionnaire: Oswestry: 3/50=6% impairment     Patient Active Problem List    Diagnosis Date Noted    Mirena 2025     Note Last Updated: 2025     Placed on 2025 ad then replaced on 25      Annual GYN exam 2021     Note Last Updated: 2022     SCREENING TESTS    Year 2017 2018   Age                       PAP   10 -  12                 HPV high risk                       VISHNU [Birads]                       JOSH (5 year)  Tyrer Kvng (lifetime)                       Colonoscopy                       DEXA [T-score]  Frax [hip/any]                       Lipids  [LDL / HDL / TG]                       Vitamin D                       TSH                         Enter the month test was performed.  If month not known, enter \"X'  Black numbers = normal results  Red numbers = abnormal results  Black X = patient reported normal  Red X - patient reported abnormal      Referred by:    Profession:    Other info: Fish patient  Family is from [place]          Past Medical History:   Diagnosis Date    Kyleena 10/2019    Placed on 10/28/2019 and removed on 23     Past Surgical History:   Procedure Laterality Date     SECTION N/A 2024    Procedure:  SECTION PRIMARY;  Surgeon: Jacob Benton MD;  [facility]: [facility] LABOR DELIVERY;  Service: Obstetrics/Gynecology;  Laterality: N/A; "       Subjective Evaluation    History of Present Illness  Mechanism of injury: Full term Csection delivery of her first child in 2024. Pregnancy generally routine, baby in breech position necessitating csection. Denies any current pn. However has noticed a large gap between her abdominals. Did have some SIJ dysfunction during pregnancy that has lingered to a lesser extent afterward. Denies bowel/bladder dysfunction. Wants to strengthen her abdominals but unsure how to do so safely.    Subjective comment: diastasis recti  Patient Occupation: office work, ; enjoys running/general exercise/aerials Quality of life: good    Pain  Current pain ratin  At best pain ratin  At worst pain ratin    Social Support  Lives with: spouse and young children    Diagnostic Tests  No diagnostic tests performed    Treatments  No previous or current treatments  Patient Goals  Patient goals for therapy: increased strength           Treatment  See Exercise, Manual, and Modality Logs for complete treatment.     Access Code: [email or web address]  URL: [email or web address]  Date: 2025  Prepared by: Jeanna Diego    Exercises  - Supine Transversus Abdominis Bracing - Hands on Stomach  - 2-3 x daily - 15-30 reps  - Supine March with Posterior Pelvic Tilt  - 1 x daily - 2-3 sets - 10 reps  - Supine Pelvic Tilt with leg glides  - 1 x daily - 2-3 sets - 10 reps  - Supine 90/90 Alternating Heel Touches with Posterior Pelvic Tilt  - 1 x daily - 2-3 sets - 10 reps  - Cervical Retraction at Wall  - 1 x daily  - Wall [place]  - 1 x daily - 2-3 sets - 10 reps    Objective          Postural Observations    Additional Postural Observation Details  Normal gait mechanics, and no observable distress  Posture appears intact    Tenderness     Additional Tenderness Details  (-) TTP lumbopelvic region    Active Range of Motion     Additional Active Range of Motion Details  Trunk AROM WNL and pain-free all planes  Mild limitation R  hip IR relative to L    Strength/Myotome Testing     Lumbar   Left   Normal strength    Right   Normal strength    Additional Strength Details  Rectus abdominis strength 5/5 with no pain reported on testing    Muscle Activation   Patient unable to activate left transverse abdominals and right transverse abdominals.     Tests     Left Pelvic Girdle/Sacrum   Negative: sacral spring and thigh thrust.     Right Pelvic Girdle/Sacrum   Negative: sacral spring and thigh thrust.     Additional Tests Details  Approx 2.5 cm width gap at linea alba, assessed just above navel          Assessment & Plan       Assessment  Impairments: impaired physical strength, lacks appropriate home exercise program and pain with function   Functional limitations: carrying objects, lifting, sleeping, walking, pulling, pushing, uncomfortable because of pain, moving in bed, sitting, standing, stooping and unable to perform repetitive tasks   Assessment details: Pt is a 34 YOF approximately 7 months status post  delivery who presents to PT w/ complaint of abdominal weakness, diastases recti.  Does appear to have widening of the linea alba and difficulty activating pelvic floor and deep core stabilizers.  BLE and rectus abdominis strength 5/5 without pain on testing.  Patient denies bowel or bladder dysfunction or pain.  Would like guidance on progressive abdominal strengthening program.  Did very well with initial program introduced in clinic.  Provided HEP with current exercises and options for safe progression.  Pt would benefit from skilled PT services to address deficits and allow return to full daily and recreational activities w/o pn or dysfunction.  Barriers to therapy: n/a  Prognosis: good    Goals  Plan Goals: LTG 6wks  1) Pt to be independent w/ long term HEP and self mgmt.  2) Pt to tolerate 30 mins or more of core strengthening/stabilization exercises without pain.    3) Pt to demonstrate appropriate carryover of trans ab  activation to various positions and functional tasks.  4) Pt to report no perceived limitation with daily/recreational activities.    Plan  Therapy options: will be seen for skilled therapy services  Planned modality interventions: TENS, thermotherapy (hydrocollator packs) and cryotherapy  Planned therapy interventions: abdominal trunk stabilization, ADL retraining, body mechanics training, flexibility, functional ROM exercises, home exercise program, joint mobilization, manual therapy, neuromuscular re-education, postural training, soft tissue mobilization, spinal/joint mobilization, strengthening, stretching and therapeutic activities  Frequency: 1x week  Duration in weeks: 8  Treatment plan discussed with: patient  Plan details: TE/TA/NMR/MT to address noted deficits, with emphasis on progressive core stabilization        History # of Personal Factors and/or Comorbidities: LOW (0)  Examination of Body System(s): # of elements: LOW (1-2)  Clinical Presentation: STABLE   Clinical Decision Making: LOW       Timed:         Manual Therapy:    0     mins  94941;     Therapeutic Exercise:    0     mins  02687;     Neuromuscular Marky:    15    mins  78700;    Therapeutic Activity:     0     mins  81043;     Gait Trainin     mins  31635;     Ultrasound:     0     mins  29844;    Ionto                               0    mins   94931  Self Care                       0     mins   05547  Work Conditioning 1-2 hours    0    mins 63114  Work Conditioning ea add'l hour    0   mins 27339      Un-Timed:  Electrical Stimulation:    0     mins  37092 ( );  Dry Needling     0     mins self-pay  Traction     0     mins 95896  Low Eval     25     Mins 93121  Mod Eval     0     Mins 40247  High Eval                       0     Mins 10758  Re-Eval     0     Mins 51001  Canalith Repos    0     mins 10447      Timed Treatment:   15   mins   Total Treatment:     40   mins          PT: Jeanna Diego, PT     License Number:  [id number]  Electronically signed by Jeanna Diego, PT, 06/24/25, 10:09 AM EDT    Certification Period: 6/24/2025 thru 9/21/2025  I certify that the therapy services are furnished while this patient is under my care.  The services outlined above are required by this patient, and will be reviewed every 90 days.         Physician Signature:__________________________________________________    PHYSICIAN: Janelle Grimm MD  NPI: [id number]                                      DATE:

## (undated) DEVICE — GLV SURG BIOGEL LTX PF 7 1/2

## (undated) DEVICE — TRY SPINE BLCK WHITACRE 25G 3X5IN

## (undated) DEVICE — 4-PORT MANIFOLD: Brand: NEPTUNE 2

## (undated) DEVICE — COVER,TABLE,HVY DUTY,60"X90",STRL: Brand: MEDLINE

## (undated) DEVICE — SOL IRR NACL 0.9PCT BO 1000ML

## (undated) DEVICE — TRAP FLD MINIVAC MEGADYNE 100ML

## (undated) DEVICE — SUT PDS 0 CTX 36IN DYED Z370T

## (undated) DEVICE — APPL CHLORAPREP TINTED 26ML TEAL

## (undated) DEVICE — SUT GUT CHRM 1 CTX 36IN 905H

## (undated) DEVICE — MAT PREVALON MOBL TRANSFR AIR W/PAD REPROC 39X81IN

## (undated) DEVICE — SOL IRR H2O BO 1000ML STRL

## (undated) DEVICE — SUT VIC 2/0 CT1 27IN J339H BX/36

## (undated) DEVICE — PK C/SECT 10

## (undated) DEVICE — PENCL SMOKE/EVAC MEGADYNE TELESCP 10FT

## (undated) DEVICE — ADHS SKIN PREMIERPRO EXOFIN TOPICAL HI/VISC .5ML

## (undated) DEVICE — CLTH CLENS READYCLEANSE PERI CARE PK/5

## (undated) DEVICE — SUT VIC 3/0 CT1 27IN DYED J338H

## (undated) DEVICE — PATIENT RETURN ELECTRODE, SINGLE-USE, CONTACT QUALITY MONITORING, ADULT, WITH 9FT CORD, FOR PATIENTS WEIGING OVER 33LBS. (15KG): Brand: MEGADYNE

## (undated) DEVICE — ENSEAL 20 CM SHAFT, LARGE JAW: Brand: ENSEAL X1